# Patient Record
Sex: MALE
[De-identification: names, ages, dates, MRNs, and addresses within clinical notes are randomized per-mention and may not be internally consistent; named-entity substitution may affect disease eponyms.]

---

## 2017-04-14 ENCOUNTER — CHARTING TRANS (OUTPATIENT)
Dept: OTHER | Age: 72
End: 2017-04-14

## 2017-06-20 PROBLEM — Z86.73 H/O TIA (TRANSIENT ISCHEMIC ATTACK) AND STROKE: Status: ACTIVE | Noted: 2017-06-20

## 2017-10-07 PROBLEM — R93.1 ABNORMAL NUCLEAR CARDIAC IMAGING TEST: Status: ACTIVE | Noted: 2017-10-07

## 2018-09-04 PROBLEM — I10 ESSENTIAL HYPERTENSION: Status: ACTIVE | Noted: 2018-09-04

## 2018-09-04 PROBLEM — Z86.73 HISTORY OF TIA (TRANSIENT ISCHEMIC ATTACK): Status: ACTIVE | Noted: 2017-06-20

## 2018-11-05 VITALS
TEMPERATURE: 98.8 F | HEART RATE: 100 BPM | BODY MASS INDEX: 26.69 KG/M2 | SYSTOLIC BLOOD PRESSURE: 118 MMHG | WEIGHT: 208 LBS | DIASTOLIC BLOOD PRESSURE: 62 MMHG | RESPIRATION RATE: 16 BRPM | HEIGHT: 74 IN | OXYGEN SATURATION: 98 %

## 2020-11-23 ENCOUNTER — OFFICE VISIT (OUTPATIENT)
Dept: SURGERY | Facility: CLINIC | Age: 75
End: 2020-11-23
Payer: COMMERCIAL

## 2020-11-23 VITALS
SYSTOLIC BLOOD PRESSURE: 172 MMHG | WEIGHT: 208.81 LBS | HEART RATE: 75 BPM | DIASTOLIC BLOOD PRESSURE: 95 MMHG | TEMPERATURE: 97 F | BODY MASS INDEX: 26 KG/M2 | RESPIRATION RATE: 16 BRPM | OXYGEN SATURATION: 97 %

## 2020-11-23 DIAGNOSIS — C43.59 MALIGNANT MELANOMA OF SKIN OF TRUNK (HCC): Primary | ICD-10-CM

## 2020-11-23 PROCEDURE — 3077F SYST BP >= 140 MM HG: CPT | Performed by: SURGERY

## 2020-11-23 PROCEDURE — 99205 OFFICE O/P NEW HI 60 MIN: CPT | Performed by: SURGERY

## 2020-11-23 PROCEDURE — 3080F DIAST BP >= 90 MM HG: CPT | Performed by: SURGERY

## 2020-11-23 RX ORDER — LISINOPRIL 20 MG/1
20 TABLET ORAL DAILY
COMMUNITY
Start: 2020-11-09

## 2020-11-23 NOTE — PATIENT INSTRUCTIONS
Surgery: Wide excision melanoma right chest with sentinel lymph node biopsy    Date of Surgery: TBD    Hospital:    [facility] Philippe English., [place], [address]  Phone: [phone]    · This is a Outpatient procedure.   · Use the provide procedure. · Inform your primary care physician of your surgery and ask if him/her will need to see you prior to surgery. · If you develop symptoms of another illness prior to surgery please contact our office immediately.    · If this is an inpatient linda

## 2020-11-23 NOTE — CONSULTS
[facility]        Patient Name:  Portillo Oshea   YOB: 1945   Gender:  Male   Appt Date:  11/23/2020   Provider:  Jayme Verma MD     PATIENT PROVIDERS  Referring Provider: Jimmy Goldstein PA-C  Dermatologist: Antonio Park mouth 3 (three) times daily. , Disp: , Rfl:   •  omega-3 fatty acids 1000 MG Oral Cap, Take 2,000 mg by mouth daily. , Disp: , Rfl:   •  Vitamin B-12 1000 MCG Oral Tab, Take 1,000 mcg by mouth daily. , Disp: , Rfl:   •  folic acid (FOLVITE) 1 MG Oral Tab, Timoteo Dumont fatigue. · SKIN: + change in mole.    · HEENT: denies pain  · RESPIRATORY: denies shortness of breath, wheezing or cough   · CARDIOVASCULAR: denies chest pain, SOB, edema,orthopnea, no palpitations   · GI: denies nausea, vomiting, constipation, diarrhea; deep margin status, I discussed and recommended sentinel lymph node biopsy. The surgical treatment matter including indications and risks was discussed with him in detail.   Arrangements will be made to schedule either wide excision under local anesthesia

## 2020-11-30 ENCOUNTER — LAB ENCOUNTER (OUTPATIENT)
Dept: LAB | Facility: HOSPITAL | Age: 75
End: 2020-11-30
Attending: SURGERY

## 2020-11-30 DIAGNOSIS — C43.9 MALIGNANT MELANOMA (HCC): Primary | ICD-10-CM

## 2020-11-30 PROCEDURE — 88321 CONSLTJ&REPRT SLD PREP ELSWR: CPT

## 2020-12-03 ENCOUNTER — TELEPHONE (OUTPATIENT)
Dept: SURGERY | Facility: CLINIC | Age: 75
End: 2020-12-03

## 2020-12-03 NOTE — TELEPHONE ENCOUNTER
Called patient to schedule in office procedure since path has been completed he stated he had it done elsewhere but thanked us for our time and help

## 2021-06-14 ENCOUNTER — TELEPHONE (OUTPATIENT)
Dept: NEUROLOGY | Facility: CLINIC | Age: 76
End: 2021-06-14

## 2021-06-14 ENCOUNTER — OFFICE VISIT (OUTPATIENT)
Dept: NEUROLOGY | Facility: CLINIC | Age: 76
End: 2021-06-14
Payer: COMMERCIAL

## 2021-06-14 VITALS
WEIGHT: 215 LBS | HEART RATE: 100 BPM | OXYGEN SATURATION: 98 % | HEIGHT: 74 IN | BODY MASS INDEX: 27.59 KG/M2 | DIASTOLIC BLOOD PRESSURE: 72 MMHG | SYSTOLIC BLOOD PRESSURE: 126 MMHG

## 2021-06-14 DIAGNOSIS — Z79.01 ANTICOAGULANT LONG-TERM USE: ICD-10-CM

## 2021-06-14 DIAGNOSIS — Z96.652 HISTORY OF LEFT KNEE REPLACEMENT: ICD-10-CM

## 2021-06-14 DIAGNOSIS — I45.4 BUNDLE BRANCH BLOCK: ICD-10-CM

## 2021-06-14 DIAGNOSIS — R26.9 GAIT DISTURBANCE: ICD-10-CM

## 2021-06-14 DIAGNOSIS — M48.062 LUMBAR STENOSIS WITH NEUROGENIC CLAUDICATION: Primary | ICD-10-CM

## 2021-06-14 DIAGNOSIS — K21.9 GASTROESOPHAGEAL REFLUX DISEASE, UNSPECIFIED WHETHER ESOPHAGITIS PRESENT: ICD-10-CM

## 2021-06-14 DIAGNOSIS — M16.11 PRIMARY OSTEOARTHRITIS OF RIGHT HIP: ICD-10-CM

## 2021-06-14 DIAGNOSIS — Z96.642 HISTORY OF LEFT HIP REPLACEMENT: ICD-10-CM

## 2021-06-14 DIAGNOSIS — M48.02 CERVICAL STENOSIS OF SPINAL CANAL: ICD-10-CM

## 2021-06-14 DIAGNOSIS — F41.9 ANXIETY: ICD-10-CM

## 2021-06-14 DIAGNOSIS — M05.79 RHEUMATOID ARTHRITIS, SEROPOSITIVE, MULTIPLE SITES (HCC): ICD-10-CM

## 2021-06-14 PROBLEM — K44.9 HIATAL HERNIA: Status: ACTIVE | Noted: 2021-06-14

## 2021-06-14 PROCEDURE — 3074F SYST BP LT 130 MM HG: CPT | Performed by: PHYSICAL MEDICINE & REHABILITATION

## 2021-06-14 PROCEDURE — 99205 OFFICE O/P NEW HI 60 MIN: CPT | Performed by: PHYSICAL MEDICINE & REHABILITATION

## 2021-06-14 PROCEDURE — 3078F DIAST BP <80 MM HG: CPT | Performed by: PHYSICAL MEDICINE & REHABILITATION

## 2021-06-14 PROCEDURE — 3008F BODY MASS INDEX DOCD: CPT | Performed by: PHYSICAL MEDICINE & REHABILITATION

## 2021-06-14 NOTE — TELEPHONE ENCOUNTER
Notification or Prior Authorization is not required for the requested services L5-S1 interlaminar epidural injection cpt code 24859. Decision ID #:[id number] Will inform Nursing.

## 2021-06-14 NOTE — PROGRESS NOTES
[address]  Progress Note    CHIEF COMPLAINT:  Patient presents with:  Numbness: New right handed patient comes in for bilateral hip(L>R) weakness with N/T that radiates down to feet.  C/o tightness and inabi OTHER SURGICAL HISTORY  2004    right knee        SOCIAL HISTORY:   Social History    Occupational History      Not on file    Tobacco Use      Smoking status: Former Smoker        Packs/day: 2.00      Smokeless tobacco: Never Used      Tobacco comment: Yulissa Shoe Take 75 mg by mouth daily. ALLERGIES:     Diclofenac              UNKNOWN    Comment:Water retention / weigh gain  Voltaren [Diclofena*    SWELLING    Comment:Retaining water, sob.     REVIEW OF SYSTEMS:   Patient-reported ROS  Constitutional  Sleep at the ankles  Gait: Short steps, narrow base, decreased weightbearing on the right  SLR: neg    Data    Radiology Imaging:  No films available    1. Per care everywhere, Dr. Merlin Foreman note states:    \"MRI scan of the lumbar spine dated 01/20/2020, is review use  Will need to temporarily hold anticoagulants per PMD or cardiologist approval.  Will contact Dr. Teresa Soliz. 8. Bundle branch block  Tricyclic medications contraindicated due to presence of arrhythmia.     9. Gastroesophageal reflux disease, unspecif

## 2021-06-15 ENCOUNTER — TELEPHONE (OUTPATIENT)
Dept: NEUROLOGY | Facility: CLINIC | Age: 76
End: 2021-06-15

## 2021-06-16 NOTE — TELEPHONE ENCOUNTER
Patient has been scheduled for a L5-S1 interlaminar epidural injection  on 6/24/21 at the [facility]. Medications and allergies reviewed.  Patient informed we will need verbal or written authorization from patients Primary Care Physician/Cardiologist to hold bloo

## 2021-06-24 ENCOUNTER — OFFICE VISIT (OUTPATIENT)
Dept: SURGERY | Facility: CLINIC | Age: 76
End: 2021-06-24

## 2021-06-24 DIAGNOSIS — M48.062 LUMBAR STENOSIS WITH NEUROGENIC CLAUDICATION: Primary | ICD-10-CM

## 2021-06-24 PROCEDURE — 62323 NJX INTERLAMINAR LMBR/SAC: CPT | Performed by: PHYSICAL MEDICINE & REHABILITATION

## 2021-06-24 NOTE — PROCEDURES
Preoperative Diagnosis:  (S66.461) Lumbar stenosis with neurogenic claudication  (primary encounter diagnosis)       Postoperative Diagnosis:  (J38.602) Lumbar stenosis with neurogenic claudication  (primary encounter diagnosis)       Procedures:   L5-S1 i

## 2021-07-14 ENCOUNTER — OFFICE VISIT (OUTPATIENT)
Dept: NEUROLOGY | Facility: CLINIC | Age: 76
End: 2021-07-14
Payer: COMMERCIAL

## 2021-07-14 VITALS
WEIGHT: 215 LBS | SYSTOLIC BLOOD PRESSURE: 140 MMHG | DIASTOLIC BLOOD PRESSURE: 80 MMHG | HEART RATE: 70 BPM | BODY MASS INDEX: 27.59 KG/M2 | OXYGEN SATURATION: 96 % | HEIGHT: 74 IN

## 2021-07-14 DIAGNOSIS — Z79.01 ANTICOAGULANT LONG-TERM USE: ICD-10-CM

## 2021-07-14 DIAGNOSIS — M48.062 SPINAL STENOSIS OF LUMBAR REGION WITH NEUROGENIC CLAUDICATION: Primary | ICD-10-CM

## 2021-07-14 DIAGNOSIS — M05.79 RHEUMATOID ARTHRITIS, SEROPOSITIVE, MULTIPLE SITES (HCC): ICD-10-CM

## 2021-07-14 DIAGNOSIS — I45.4 BUNDLE BRANCH BLOCK: ICD-10-CM

## 2021-07-14 DIAGNOSIS — R26.9 GAIT DISTURBANCE: ICD-10-CM

## 2021-07-14 DIAGNOSIS — K21.9 GASTROESOPHAGEAL REFLUX DISEASE, UNSPECIFIED WHETHER ESOPHAGITIS PRESENT: ICD-10-CM

## 2021-07-14 DIAGNOSIS — M48.02 CERVICAL STENOSIS OF SPINAL CANAL: ICD-10-CM

## 2021-07-14 PROCEDURE — 3079F DIAST BP 80-89 MM HG: CPT | Performed by: PHYSICAL MEDICINE & REHABILITATION

## 2021-07-14 PROCEDURE — 3008F BODY MASS INDEX DOCD: CPT | Performed by: PHYSICAL MEDICINE & REHABILITATION

## 2021-07-14 PROCEDURE — 3077F SYST BP >= 140 MM HG: CPT | Performed by: PHYSICAL MEDICINE & REHABILITATION

## 2021-07-14 PROCEDURE — 99214 OFFICE O/P EST MOD 30 MIN: CPT | Performed by: PHYSICAL MEDICINE & REHABILITATION

## 2021-07-14 NOTE — PROGRESS NOTES
[address]  Progress Note    CHIEF COMPLAINT:  Patient presents with:  Numbness: pt is here for f/u numbness on legs and feet. LOV 6/24/21. pt  states 25% improvement post injection. rates pain 5/10. Laterality Date   • ANGIOGRAM  08/2018   • HIP REPLACEMENT SURGERY     • KNEE REPLACEMENT SURGERY     • OTHER SURGICAL HISTORY  1970    left knee cartilege removal   • OTHER SURGICAL HISTORY  2004    right knee        SOCIAL HISTORY:   Social History    Oc Oral Tablet 24 Hr Take 12.5 mg by mouth daily. • VIAGRA 100 MG Oral Tab Take 100 mg by mouth daily as needed. • Clopidogrel Bisulfate (PLAVIX) 75 MG Oral Tab Take 75 mg by mouth daily.          ALLERGIES:     Diclofenac              UNKNOWN    C stenosis, central and lateral recess stenosis L2-3, L3-4 and L4-L5. L5-S1 is well preserved. L1-L2 shows degenerative disk disease without conus or root compression. There is a slight degenerative tilt at the 2-3 level. \"    2.   An cervical MRI report from [facility] Redman

## 2021-07-15 PROBLEM — M48.062 SPINAL STENOSIS OF LUMBAR REGION WITH NEUROGENIC CLAUDICATION: Status: ACTIVE | Noted: 2021-06-14

## 2021-08-13 ENCOUNTER — OFFICE VISIT (OUTPATIENT)
Dept: NEUROLOGY | Facility: CLINIC | Age: 76
End: 2021-08-13
Payer: COMMERCIAL

## 2021-08-13 VITALS — BODY MASS INDEX: 27.3 KG/M2 | HEIGHT: 72.75 IN | WEIGHT: 206 LBS

## 2021-08-13 DIAGNOSIS — M48.02 CERVICAL STENOSIS OF SPINAL CANAL: ICD-10-CM

## 2021-08-13 DIAGNOSIS — R26.9 GAIT DISTURBANCE: ICD-10-CM

## 2021-08-13 DIAGNOSIS — M48.062 SPINAL STENOSIS OF LUMBAR REGION WITH NEUROGENIC CLAUDICATION: Primary | ICD-10-CM

## 2021-08-13 PROCEDURE — 99213 OFFICE O/P EST LOW 20 MIN: CPT | Performed by: PHYSICAL MEDICINE & REHABILITATION

## 2021-08-13 PROCEDURE — 3008F BODY MASS INDEX DOCD: CPT | Performed by: PHYSICAL MEDICINE & REHABILITATION

## 2021-08-13 NOTE — PROGRESS NOTES
[address]  Progress Note    CHIEF COMPLAINT:  Patient presents with:  Low Back Pain: Patient present for f/u on low back pain. State tightness of glute and hamstring.  when walking more than 13 minutes get n Comment: daily drink.        Drug use: No      Sexual activity: Not on file      CURRENT MEDICATIONS:   Current Outpatient Medications   Medication Sig Dispense Refill   • HYDROCHLOROTHIAZIDE 12.5 MG Oral Tab TAKE 1 TABLET BY MOUTH EVERY DAY (Patient taking Urinating: admits  Bladder Incontinence: denies   Musculoskeletal  Joint Stiffness: denies  Painful Joints: denies   Neurological  Loss of Strength Since last Visit: denies  Tingling/Numbness: admits (legs at times.)            All other systems reviewed a September    3. Gait disturbance  Might improve with cervical decompression. No orders of the defined types were placed in this encounter. Discharge Instructions were provided as documented in AVS summary.   The patient was in agreement with the

## 2021-11-03 ENCOUNTER — HOSPITAL ENCOUNTER (OUTPATIENT)
Dept: GENERAL RADIOLOGY | Facility: HOSPITAL | Age: 76
Discharge: HOME | End: 2021-11-03
Attending: ORTHOPAEDIC SURGERY
Payer: MEDICARE

## 2021-11-03 DIAGNOSIS — M47.12 CERVICAL ARTHRITIS WITH MYELOPATHY: ICD-10-CM

## 2021-11-03 PROCEDURE — 72040 X-RAY EXAM NECK SPINE 2-3 VW: CPT | Performed by: ORTHOPAEDIC SURGERY

## 2021-12-15 ENCOUNTER — HOSPITAL ENCOUNTER (OUTPATIENT)
Dept: GENERAL RADIOLOGY | Facility: HOSPITAL | Age: 76
Discharge: HOME | End: 2021-12-15
Attending: ORTHOPAEDIC SURGERY
Payer: MEDICARE

## 2021-12-15 DIAGNOSIS — M48.062 LUMBAR STENOSIS WITH NEUROGENIC CLAUDICATION: ICD-10-CM

## 2021-12-15 DIAGNOSIS — M47.12 CERVICAL SPONDYLOSIS WITH MYELOPATHY: ICD-10-CM

## 2021-12-15 PROCEDURE — 72050 X-RAY EXAM NECK SPINE 4/5VWS: CPT | Performed by: ORTHOPAEDIC SURGERY

## 2021-12-15 PROCEDURE — 72110 X-RAY EXAM L-2 SPINE 4/>VWS: CPT | Performed by: ORTHOPAEDIC SURGERY

## 2021-12-23 ENCOUNTER — HOSPITAL ENCOUNTER (OUTPATIENT)
Dept: MRI IMAGING | Age: 76
Discharge: HOME | End: 2021-12-23
Attending: ORTHOPAEDIC SURGERY
Payer: MEDICARE

## 2021-12-23 DIAGNOSIS — M48.062 LUMBAR STENOSIS WITH NEUROGENIC CLAUDICATION: ICD-10-CM

## 2021-12-23 PROCEDURE — 72148 MRI LUMBAR SPINE W/O DYE: CPT | Performed by: ORTHOPAEDIC SURGERY

## 2022-05-04 ENCOUNTER — HOSPITAL ENCOUNTER (OUTPATIENT)
Dept: GENERAL RADIOLOGY | Facility: HOSPITAL | Age: 77
Discharge: HOME | End: 2022-05-04
Attending: ORTHOPAEDIC SURGERY
Payer: MEDICARE

## 2022-05-04 DIAGNOSIS — M47.12 CERVICAL SPONDYLOSIS WITH MYELOPATHY: ICD-10-CM

## 2022-05-04 PROCEDURE — 72050 X-RAY EXAM NECK SPINE 4/5VWS: CPT | Performed by: ORTHOPAEDIC SURGERY

## 2023-01-18 ENCOUNTER — HOSPITAL ENCOUNTER (OUTPATIENT)
Dept: GENERAL RADIOLOGY | Facility: HOSPITAL | Age: 78
Discharge: HOME | End: 2023-01-18
Attending: ORTHOPAEDIC SURGERY
Payer: MEDICARE

## 2023-01-18 DIAGNOSIS — M47.12 CERVICAL SPONDYLOSIS WITH MYELOPATHY: ICD-10-CM

## 2023-01-18 PROCEDURE — 72050 X-RAY EXAM NECK SPINE 4/5VWS: CPT | Performed by: ORTHOPAEDIC SURGERY

## 2023-01-18 PROCEDURE — 72040 X-RAY EXAM NECK SPINE 2-3 VW: CPT | Performed by: ORTHOPAEDIC SURGERY

## 2023-09-25 ENCOUNTER — OFFICE VISIT (OUTPATIENT)
Dept: RHEUMATOLOGY | Facility: CLINIC | Age: 78
End: 2023-09-25
Payer: COMMERCIAL

## 2023-09-25 VITALS
SYSTOLIC BLOOD PRESSURE: 142 MMHG | HEART RATE: 77 BPM | DIASTOLIC BLOOD PRESSURE: 90 MMHG | TEMPERATURE: 98 F | HEIGHT: 74 IN | BODY MASS INDEX: 26.95 KG/M2 | OXYGEN SATURATION: 98 % | WEIGHT: 210 LBS | RESPIRATION RATE: 16 BRPM

## 2023-09-25 DIAGNOSIS — M06.09 RHEUMATOID ARTHRITIS OF MULTIPLE SITES WITH NEGATIVE RHEUMATOID FACTOR (HCC): Primary | ICD-10-CM

## 2023-09-25 DIAGNOSIS — M19.90 OSTEOARTHRITIS, UNSPECIFIED OSTEOARTHRITIS TYPE, UNSPECIFIED SITE: ICD-10-CM

## 2023-09-25 PROCEDURE — 3080F DIAST BP >= 90 MM HG: CPT | Performed by: INTERNAL MEDICINE

## 2023-09-25 PROCEDURE — 3077F SYST BP >= 140 MM HG: CPT | Performed by: INTERNAL MEDICINE

## 2023-09-25 PROCEDURE — 3008F BODY MASS INDEX DOCD: CPT | Performed by: INTERNAL MEDICINE

## 2023-09-25 PROCEDURE — 99215 OFFICE O/P EST HI 40 MIN: CPT | Performed by: INTERNAL MEDICINE

## 2023-09-25 RX ORDER — GABAPENTIN 300 MG/1
300 CAPSULE ORAL 3 TIMES DAILY
COMMUNITY
Start: 2023-03-26

## 2023-09-25 RX ORDER — FOLIC ACID 1 MG/1
1 TABLET ORAL DAILY
Qty: 90 TABLET | Refills: 1 | Status: SHIPPED | OUTPATIENT
Start: 2023-09-25

## 2023-09-25 NOTE — PATIENT INSTRUCTIONS
OSTEOARTHRITIS    Fast Facts    Though some of the joint changes are irreversible, most patients will not need joint replacement surgery. OA symptoms (what you feel) can vary greatly among patients. A rheumatologist can detect arthritis and prescribe the proper treatment. The goal of treatment in OA is to reduce pain and improve function. Exercise is an important part of OA treatment, because it can decrease joint pain and improve function. At present, there is no treatment that can reverse the damage of OA in the joints. Researchers are trying to find ways to slow or reverse this joint damage. Osteoarthritis (also known as OA) is a common joint disease that most often affects middle-age to elderly people. It is commonly referred to as \"wear and tear\" of the joints, but we now know that OA is a disease of the entire joint, involving the cartilage, joint lining, ligaments, and bone. Although it is more common in older people, it is not really accurate to say that the joints are just \"wearing out. \" It is characterized by breakdown of the cartilage (the tissue that cushions the ends of the bones between joints), bony changes of the joints, deterioration of tendons and ligaments, and various degrees of inflammation of the joint lining (called the synovium). This arthritis tends to occur in the hand joints, spine, hips, knees, and great toes. The lifetime risk of developing OA of the knee is about 46%, and the lifetime risk of developing OA of the hip is 25%, according to the [facility], a long-term study from the [address] [place] [address] and sponsored by [employer] for [employer] and [employer] (often called the [place]) and the [place]. OA is a top cause of disability in older people. The goal of osteoarthritis treatment is to reduce pain and improve function.  There is no cure for the disease, but some treatments attempt to slow disease progression. What is osteoarthritis? OA is a frequently slowly progressive joint disease typically seen in middle-aged to elderly people. In osteoarthritis, the cartilage between the bones in the joint breaks down. This causes the affected bones to slowly get bigger. The joint cartilage often breaks down because of mechanical stress or biochemical changes within the body, causing the bone underneath to fail. OA can occur together with other types of arthritis, such as gout or rheumatoid arthritis. OA tends to affect commonly used joints such as the hands and spine, and the weight-bearing joints such as the hips and knees. Symptoms include:    Joint pain and stiffness    Knobby swelling at the joint    Cracking or grinding noise with joint movement    Decreased function of the joint    How do you treat osteoarthritis? There is no proven treatment yet that can reverse joint damage from OA. The goal of osteoarthritis treatment is to reduce pain and improve function of the affected joints. Most often, this is possible with a mixture of physical measures and drug therapy and, sometimes, surgery. Physical measures: Weight loss and exercise are useful in OA. Excess weight puts stress on your knee joints and hips and low back. For every 10 pounds of weight you lose over 10 years, you can reduce the chance of developing knee OA by up to 50 percent. Exercise can improve your muscle strength, decrease joint pain and stiffness, and lower the chance of disability due to OA. Also helpful are support (\"assistive\") devices, such as orthotics or a walking cane, that help you do daily activities. Heat or cold therapy can help relieve OA symptoms for a short time. Certain alternative treatments such as spa (hot tub), massage, and chiropractic manipulation can help relieve pain for a short time. They can be costly, though, and require repeated treatments.  Also, the long-term benefits of these alternative (sometimes called complementary or integrative) medicine treatments are unproven but are under study. Drug therapy: Forms of drug therapy include topical, oral (by mouth) and injections (shots). You apply topical drugs directly on the skin over the affected joints. These medicines include capsaicin cream, lidocaine and diclofenac gel. Oral pain relievers such as acetaminophen are common first treatments. So are nonsteroidal anti-inflammatory drugs (often called NSAIDs), which decrease swelling and pain. In 2010, the government (FDA) approved the use of duloxetine (Cymbalta) for chronic (long-term) musculoskeletal pain including from OA. This oral drug is not new. It also is in use for other health concerns, such as mood disorders, nerve pain and fibromyalgia. Patients with more serious pain may need stronger medications, such as prescription narcotics. Joint injections with corticosteroids (sometimes called cortisone shots) or with a form of lubricant called hyaluronic acid can give months of pain relief from OA. This lubricant is given in the knee, and these shots may help delay the need for a knee replacement by a few years in some patients. Surgery: Surgical treatment becomes an option for severe cases. This includes when the joint has serious damage, or when medical treatment fails to relieve pain and you have major loss of function. Surgery may involve arthroscopy, repair of the joint done through small incisions (cuts). If the joint damage cannot be repaired, you may need a joint replacement. Supplements: Many over-the-counter nutrition supplements have been used for osteoarthritis treatment. Most lack good research data to support their effectiveness and safety. Among the most widely used are calcium, vitamin D and omega-3 fatty acids. To ensure safety and avoid drug interactions, consult your doctor or pharmacist before using any of these supplements.  This is especially true when you are combining these supplements with prescribed

## 2023-10-11 ENCOUNTER — TELEPHONE (OUTPATIENT)
Dept: RHEUMATOLOGY | Facility: CLINIC | Age: 78
End: 2023-10-11

## 2024-03-18 ENCOUNTER — OFFICE VISIT (OUTPATIENT)
Dept: RHEUMATOLOGY | Facility: CLINIC | Age: 79
End: 2024-03-18
Payer: COMMERCIAL

## 2024-03-18 ENCOUNTER — LAB ENCOUNTER (OUTPATIENT)
Dept: LAB | Age: 79
End: 2024-03-18
Attending: INTERNAL MEDICINE
Payer: MEDICARE

## 2024-03-18 ENCOUNTER — HOSPITAL ENCOUNTER (OUTPATIENT)
Dept: GENERAL RADIOLOGY | Age: 79
Discharge: HOME | End: 2024-03-18
Attending: INTERNAL MEDICINE
Payer: MEDICARE

## 2024-03-18 VITALS
SYSTOLIC BLOOD PRESSURE: 134 MMHG | DIASTOLIC BLOOD PRESSURE: 72 MMHG | HEIGHT: 73 IN | OXYGEN SATURATION: 98 % | WEIGHT: 205 LBS | RESPIRATION RATE: 16 BRPM | HEART RATE: 68 BPM | BODY MASS INDEX: 27.17 KG/M2 | TEMPERATURE: 98 F

## 2024-03-18 DIAGNOSIS — Z79.899 ENCOUNTER FOR DRUG THERAPY: ICD-10-CM

## 2024-03-18 DIAGNOSIS — M48.062 SPINAL STENOSIS OF LUMBAR REGION WITH NEUROGENIC CLAUDICATION: ICD-10-CM

## 2024-03-18 DIAGNOSIS — M06.09 RHEUMATOID ARTHRITIS OF MULTIPLE SITES WITH NEGATIVE RHEUMATOID FACTOR (HCC): Primary | ICD-10-CM

## 2024-03-18 DIAGNOSIS — M19.90 OSTEOARTHRITIS, UNSPECIFIED OSTEOARTHRITIS TYPE, UNSPECIFIED SITE: ICD-10-CM

## 2024-03-18 DIAGNOSIS — M06.09 RHEUMATOID ARTHRITIS OF MULTIPLE SITES WITH NEGATIVE RHEUMATOID FACTOR (HCC): ICD-10-CM

## 2024-03-18 LAB
ALBUMIN SERPL-MCNC: 3.7 G/DL (ref 3.4–5)
ALBUMIN/GLOB SERPL: 1.4 {RATIO} (ref 1–2)
ALP LIVER SERPL-CCNC: 85 U/L
ALT SERPL-CCNC: 23 U/L
ANION GAP SERPL CALC-SCNC: 5 MMOL/L (ref 0–18)
AST SERPL-CCNC: 11 U/L (ref 15–37)
BASOPHILS # BLD AUTO: 0.03 X10(3) UL (ref 0–0.2)
BASOPHILS NFR BLD AUTO: 0.5 %
BILIRUB SERPL-MCNC: 0.6 MG/DL (ref 0.1–2)
BUN BLD-MCNC: 15 MG/DL (ref 9–23)
CALCIUM BLD-MCNC: 9 MG/DL (ref 8.5–10.1)
CHLORIDE SERPL-SCNC: 107 MMOL/L (ref 98–112)
CO2 SERPL-SCNC: 27 MMOL/L (ref 21–32)
CREAT BLD-MCNC: 1.04 MG/DL
EGFRCR SERPLBLD CKD-EPI 2021: 74 ML/MIN/1.73M2 (ref 60–?)
EOSINOPHIL # BLD AUTO: 0.1 X10(3) UL (ref 0–0.7)
EOSINOPHIL NFR BLD AUTO: 1.6 %
ERYTHROCYTE [DISTWIDTH] IN BLOOD BY AUTOMATED COUNT: 13.4 %
ERYTHROCYTE [SEDIMENTATION RATE] IN BLOOD: 4 MM/HR
FASTING STATUS PATIENT QL REPORTED: YES
GLOBULIN PLAS-MCNC: 2.7 G/DL (ref 2.8–4.4)
GLUCOSE BLD-MCNC: 98 MG/DL (ref 70–99)
HCT VFR BLD AUTO: 41.1 %
HGB BLD-MCNC: 13.6 G/DL
IMM GRANULOCYTES # BLD AUTO: 0.03 X10(3) UL (ref 0–1)
IMM GRANULOCYTES NFR BLD: 0.5 %
LYMPHOCYTES # BLD AUTO: 1.63 X10(3) UL (ref 1–4)
LYMPHOCYTES NFR BLD AUTO: 26 %
MCH RBC QN AUTO: 32.5 PG (ref 26–34)
MCHC RBC AUTO-ENTMCNC: 33.1 G/DL (ref 31–37)
MCV RBC AUTO: 98.3 FL
MONOCYTES # BLD AUTO: 0.61 X10(3) UL (ref 0.1–1)
MONOCYTES NFR BLD AUTO: 9.7 %
NEUTROPHILS # BLD AUTO: 3.87 X10 (3) UL (ref 1.5–7.7)
NEUTROPHILS # BLD AUTO: 3.87 X10(3) UL (ref 1.5–7.7)
NEUTROPHILS NFR BLD AUTO: 61.7 %
OSMOLALITY SERPL CALC.SUM OF ELEC: 289 MOSM/KG (ref 275–295)
PLATELET # BLD AUTO: 249 10(3)UL (ref 150–450)
POTASSIUM SERPL-SCNC: 4.3 MMOL/L (ref 3.5–5.1)
PROT SERPL-MCNC: 6.4 G/DL (ref 6.4–8.2)
RBC # BLD AUTO: 4.18 X10(6)UL
SODIUM SERPL-SCNC: 139 MMOL/L (ref 136–145)
WBC # BLD AUTO: 6.3 X10(3) UL (ref 4–11)

## 2024-03-18 PROCEDURE — 73130 X-RAY EXAM OF HAND: CPT | Performed by: INTERNAL MEDICINE

## 2024-03-18 PROCEDURE — 85025 COMPLETE CBC W/AUTO DIFF WBC: CPT

## 2024-03-18 PROCEDURE — 85652 RBC SED RATE AUTOMATED: CPT

## 2024-03-18 PROCEDURE — 80053 COMPREHEN METABOLIC PANEL: CPT

## 2024-03-18 PROCEDURE — 99214 OFFICE O/P EST MOD 30 MIN: CPT | Performed by: INTERNAL MEDICINE

## 2024-03-18 PROCEDURE — 36415 COLL VENOUS BLD VENIPUNCTURE: CPT

## 2024-03-18 RX ORDER — TRAMADOL HYDROCHLORIDE 50 MG/1
1 TABLET ORAL EVERY 8 HOURS PRN
COMMUNITY
Start: 2023-10-21

## 2024-03-18 NOTE — PROGRESS NOTES
[facility], [address] [place] [address]      Consult     Ramiro Block Patient Status:  No patient class for patient encounter    6/3/1945 MRN [id number]   Location [facility], [address] [place] [address] Attending No att. providers found   Hosp Day # 0 PCP GIL GANN     Referring Provider: PCP    Reason for Consultation: Rheumatoid arthritis seronegative    Subjective:    Ramiro Block is a 78 year old  male comes in for reevaluation for seronegative rheumatoid arthritis and generalized osteoarthritis.     Patient was last seen in clinic 2023    He was going through some issues with significant back pain and neuropathy    We had offered him gabapentin which he had started and taken over by a pain specialist at [facility]    He is now up to 3 tablets a day around 1200 mg a day    At that time because of all his pain we continued methotrexate 15 mg a week     He is reluctant to wean    Sometime 2023 he had sudden foot drop and weakness likely from findings found on MRI of a collapse of the vertebra.  He was not interested in surgery and is doing physical therapy aggressively with some improvement.  He was told with a second opinion surgery may not fix the problem he is happy with his progress     He is finally done with rehab program and doing quite well.  He is now going to focus on his wife to get knee replacement in 2023    At this time he is doing quite well with the combination of physical therapy and gabapentin    States no swelling of his joints he is now willing to wean down methotrexate further    He has not had any worsening symptoms    Patient states his PCP had filled his methotrexate and he did not get his labs in between visits.  Reminded patient the importance of getting methotrexate labs to monitor for drug toxicity new standing labs given to patient today    Also suggested updating hand x-rays to monitor for radiographic  progression    Unfortunately in 2022 he had revision surgery of his right hip since it gave out on 2 occasions. He also had lumbar surgery about a year ago    Around that time he had CT of the lower spine reviewed with patient today showing areas of stenosis arthritis and disc bulging    We have discussed obtaining ESR CRP as well as his methotrexate labs today    At the rare consideration of PMR. However ESR and CRP will likely be elevated because of recent surgeries as well as a possibility    His second revision was September 2022    He is taking his meloxicam.     About a year ago he did get cervical neck fusion    He is using a walker because of instability of his lower extremities and pain    States no flareups of the rheumatoid arthritis or joint swelling that is concerning.    He did have recent cardiac clearance and chest x-ray which was unremarkable in September 2021    He did have some mild edema of his lower extremities where he was placed on Lasix with improvement.    States no swelling of his joints otherwise. He did see his neurosurgeon who did repeat another MRI of the cervical neck which showed stable stenosis and arthritis in 2017. H    Previously in September 2021 he did get    Cervical laminectomy because of balance issues and worsening neck pain with improvement overall    Denies any urinary bowel incontinence     States no swelling of his joints otherwise. States no infections    He did get his vaccinations for coronavirus without any side effects or problem    His knee replacement of the left knee within about 6 years and continues to do well.     Bone density was done 2018 and reviewed with patient normal density improvement in T-scores of lumbar spine and hip past years.     Denies any swelling of his joints. No shortness of breath chest pain fevers or chills.     X-rays of the hands and feet upddated December 2017 showing stable and osteoarthritis. vectra testing was stable at 35.    he  denies any swelling in his joints overall pain levels are minimal to none except for occasional back pain.     his weight has been stable denies any headaches to claudication vision changes.    He denies any infections since last visit. He is not taking meloxicam regularly.     They have Taken him off Plavix as well     He understands the potential interaction between both.    History/Other:      Past Medical History:  Past Medical History:   Diagnosis Date    Esophageal reflux     Essential hypertension     Heart attack (HCC)     High blood pressure     High cholesterol     Hyperlipidemia     Osteoarthrosis, unspecified whether generalized or localized, unspecified site     bilateral knees.     RBBB (right bundle branch block)     Tachycardia     TIA (transient ischemic attack) april 2012        Past Surgical History:   Past Surgical History:   Procedure Laterality Date    ANGIOGRAM  08/2018    BACK SURGERY N/A 04/2022    CERVICAL LAMINOPLASTY  09/2021    [facility]     HIP REPLACEMENT SURGERY      KNEE REPLACEMENT SURGERY      OTHER SURGICAL HISTORY  1970    left knee cartilege removal    OTHER SURGICAL HISTORY  2004    right knee        Social History:  reports that he has quit smoking. His smoking use included cigarettes. He smoked an average of 2 packs per day. He has never used smokeless tobacco. He reports current alcohol use of about 6.0 standard drinks of alcohol per week. He reports that he does not use drugs.    Family History: No family history on file.    Allergies:   Allergies   Allergen Reactions    Diclofenac UNKNOWN     Water retention / weigh gain    Voltaren [Diclofenac Sodium] SWELLING     Retaining water, sob.        Current Medications:  Current Outpatient Medications   Medication Sig Dispense Refill    traMADol 50 MG Oral Tab Take 1 tablet (50 mg total) by mouth every 8 (eight) hours as needed.      gabapentin 300 MG Oral Cap Take 1 capsule (300 mg total) by mouth in the morning, at  noon, in the evening, and at bedtime. TID      methotrexate 2.5 MG Oral Tab Take 5 tablets (12.5 mg total) by mouth once a week. (Patient taking differently: Take 6 tablets (15 mg total) by mouth once a week.) 20 tablet 2    folic acid 1 MG Oral Tab Take 1 tablet (1 mg total) by mouth daily. 90 tablet 1    HYDROCHLOROTHIAZIDE 12.5 MG Oral Tab TAKE 1 TABLET BY MOUTH EVERY DAY (Patient taking differently: Take 1 tablet (12.5 mg total) by mouth as needed.) 90 tablet 1    Terazosin HCl 2 MG Oral Cap Take 1 capsule (2 mg total) by mouth nightly.      Vitamin D3, Cholecalciferol, 50 MCG (2000 UT) Oral Cap Take 1 capsule (2,000 Units total) by mouth daily.      Turmeric 450 MG Oral Cap Take 1 tablet by mouth daily.      Ascorbic Acid (VITAMIN C) 1000 MG Oral Tab Take 1 tablet (1,000 mg total) by mouth daily.      lisinopril 20 MG Oral Tab Take 1 tablet (20 mg total) by mouth daily.      Rosuvastatin Calcium 5 MG Oral Tab Take 1 tablet (5 mg total) by mouth nightly. 90 tablet 1    omeprazole 20 MG Oral Capsule Delayed Release Take 1 capsule (20 mg total) by mouth. As needed once every other day      omega-3 fatty acids 1000 MG Oral Cap Take 2,000 mg by mouth daily.      Vitamin B-12 1000 MCG Oral Tab Take 1 tablet (1,000 mcg total) by mouth daily.      Metoprolol Succinate ER (TOPROL XL) 25 MG Oral Tablet 24 Hr Take 0.5 tablets (12.5 mg total) by mouth daily.      VIAGRA 100 MG Oral Tab Take 1 tablet (100 mg total) by mouth daily as needed.            (Not in a hospital admission)      Review of Systems:     Constitutional: Negative for chills, , fatigue, fever and unexpected weight change.    HENT: Negative for congestion, and mouth sores.    Eyes: Negative for photophobia, pain, redness and visual disturbance.    Respiratory: Negative for apnea, cough, chest tightness, shortness of breath, wheezing and stridor.    Cardiovascular: Negative for chest pain, palpitations and leg swelling.    Gastrointestinal: Negative for  abdominal distention, abdominal pain, blood in stool, constipation, diarrhea and nausea.    Endocrine: Negative.     Genitourinary: Negative for decreased urine volume, difficulty urinating, dyspareunia, dysuria, flank pain, and frequency.    Musculoskeletal: Occasional arthralgias, +gait problem from foot drop and NO joint swelling.    Skin: Negative for color change, pallor and rash. No raynauds or digital ulcerations no sclerodactly.    Allergic/Immunologic: Negative.    Neurological: Negative for dizziness, tremors, seizures, syncope, speech difficulty, weakness, light-headedness, numbness and headaches.    Hematological: Does not bruise/bleed easily.    Psychiatric/Behavioral: Negative for confusion, decreased concentration, hallucinations, self-injury, sleep disturbance and suicidal ideas or depression.    Objective:   Vitals:    03/18/24 1028   BP: 134/72   Pulse: 68   Resp: 16   Temp: 98.1 °F (36.7 °C)          Constitutional: is oriented to person, place, and time. Appears well-developed and well-nourished. No distress.    HEENT: Normocephalic; EOMI; no jvd; no LAD; no oral or nasal ulcers.     Eyes: Conjunctivae and EOM are normal. Pupils are equal, round, and reactive to light.     Neck: Normal range of motion. No thyromegaly present.    Cardiovascular: RRR, no murmurs.    Lungs: Clear, Bilateral air entry, no wheezes.    Abdominal: Soft.    Musculoskeletal:    There is currently no information documented on the homunculus. Go to the Rheumatology activity and complete the homunculus joint exam.     Joint Exam 03/18/2024     No joint exam has been documented for this visit        Swollen: -- 0    Tender: -- 0        Right shoulder: Exhibits normal range of motion on abduction and internal rotation, no tenderness, no bony tenderness, no deformity, no laceration, no pain and no spasm.        Left shoulder: Exhibits normal range of motion on abduction and internal and external rotation.  no tenderness, no  bony tenderness, no swelling, no effusion, no deformity, no pain, no spasm and normal strength.        Right elbow:  Exhibits normal range of motion, no swelling, no effusion and no deformity. No tenderness found. No medial epicondyle, no lateral epicondyle and no olecranon process tenderness noted. There are no contractures or tophi or nodules.        Left elbow:  Normal range of motion, no swelling, no effusion and no deformity. No medial epicondyle, no lateral epicondyle and no olecranon process tenderness noted. There are no contractures or tophi or nodules.        Right wrist:  Exhibits normal range of motion, no tenderness, no bony tenderness, no swelling, no effusion and no crepitus. Flexion and extension intact w/o limitation.        Left wrist: Exhibits normal range of motion, no tenderness, no bony tenderness, no swelling, no effusion, no crepitus and no deformity. Flexion and extension intact without limitation.        Right hip: Exhibits normal range of motion, normal strength, no tenderness, no bony tenderness, no swelling and no crepitus.        Right hand: No synovitis of MCP,PIP or DIP joints; no Bouchards there are scattered Heberden nodules noted;  strength: 100%.  Mild squaring first CMC joint        Left hand: No synovitis of MCP,PIP or DIP joints; no Bouchards there are scattered Heberden nodules noted;  strength: 100%.  Mild squaring first CMC joint        Left hip: Exhibits normal range of motion, normal strength, no tenderness, no bony tenderness, No swelling and no crepitus.        Right knee: Exhibits normal range of motion, no swelling, no effusion, no ecchymosis, no deformity and no erythema. No tenderness found. No medial joint line, no lateral joint line, no MCL and no LCL tenderness noted. mild crepitation on flexion of knee and extension normal.        Left knee:  Exhibits normal range of motion, no swelling, no effusion, no ecchymosis and no erythema. No tenderness found. No  medial joint line, no lateral joint line and no patellar tendon tenderness noted. mild crepitation on flexion of the knee. Extension intact and normal.        Right ankle: No swelling, no deformity. No tenderness. Dorsiflexion and plantar flexion intact without limitation in range of motion.        Left ankle: Exhibits no swelling. No tenderness. No lateral malleolus and no medial malleolus tenderness found. Achilles tendon normal. Achilles tendon exhibits no pain, no defect and normal Moss's test results.  Dorsiflexion and plantar flexion intact without limitation in range of motion.        Cervical back: Exhibits normal range of motion, no tenderness, no bony tenderness, no swelling, no pain and no spasm.        Thoracic back: Exhibits normal range of motion, no tenderness, no bony tenderness and no spasm.        Lumbar back:  Exhibits normal range of motion, no tenderness, no bony tenderness, no pain and no spasm.        Right foot: normal. There is normal range of motion, no tenderness, no bony tenderness, no crepitus and no laceration. There is no synovitis or tenderness of the MTP joints to palpation.  Bony enlargement MTP joint        Left foot: normal. There is normal range of motion, no tenderness, no bony tenderness and no crepitus. There is no synovitis or tenderness of the MTP joints to palpation.  Bony enlargement MTP joints    Lymphadenopathy: No submental, no submandibular, and no occipital adenopathy present, has no cervical adenopathy or axillary lympadenopathy.    Neurological: Alert and oriented. No focal motor or sensory abnormalities. Strength is 5/5 Upper Extremities/Lower Extremities proximally and distall  Noted mild foot drop right leg right foot/3+4 plantarflexion and dorsiflexion    Skin: Skin is warm, dry and intact.  No rashes no purpuric petechial lesions    Psychiatric: Normal behavior.    Results:    Labs:      Lab Results   Component Value Date    HGB 14.0 07/24/2018    HCT 40.5  07/24/2018    MCV 95.8 07/24/2018    MCH 33.0 07/24/2018    MCHC 34.5 07/24/2018    RDW 14.0 07/24/2018       No components found for: \"RELY\", \"NMET\", \"MYEL\", \"PROMY\", \"BRUNO\", \"ABSNEUTS\", \"ABSBANDS\", \"ABMM\", \"ABMY\", \"ABPM\", \"ABBL\"      Lab Results   Component Value Date     07/24/2018    K 4.0 07/24/2018    CO2 27 07/24/2018    BUN 9 07/24/2018          No components found for: \"ESRWESTERGRN\"       No results found for: \"CRP\"      No results found for: \"COLOR\", \"CLARITY\", \"UROBILINOGEN\", \"YEAST\"  @LABRCNTIP(RF,B12)@      [unfilled]    Imaging:  No results found.    Assessment & Plan:      78-year-old woman comes in for reevaluation for:    Seronegative rheumatoid arthritis  Generalized osteoarthritis multiple joints  History of chronic neck pain and stenosis and arthritis  High drug risk monitoring  Liver nodules of unclear etiology followed by PCP  Status post cervical and lumbar fusion with last surgery 4 months ago and improvement in neuropathies  History of lumbar stenosis DJD and disc bulges followed by pain management  Revision of right hip surgery September 2022  Right foot drop improving status postsurgery followed by spine surgeo doing physical therapy    Patient has no joint swelling or synovitis on exam    X-rays of the hands and feet show stable degenerative osteoarthritis 2017    New hand x-ray orders given to patient    Apparently did get the DEXA scan given to him September 2023 at [facility].  Would like to obtain these results    Last vectra was 35    Patient remained on methotrexate 15 mg a week, folate acid 1 mg daily    New standing labs given to patient to monitor for drug toxicity from methotrexate.  Reminded patient importance of doing so at this time    Continue monitoring labs every 8-12 weeks for drug toxicity.  It appears his PCP had given him refills without monitoring labs.  Discussed importance of making sure he gets methotrexate from us and gets labs on time for  safety reasons    He is followed by [facility] neurosurgery with recent cervical laminectomy and improvement. He is also aware he has lumbar stenosis and claudication. He is not interested in surgery as of yet.    Head CT showing moderate severe stenosis likely causing some of this pain    We have initiated gabapentin but this is now taken over by pain management and he has up to 5 tablets a day with improvement along with physical therapy    He is off Plavix and now on baby aspirin       Education and counseling provided to patient.  Instructed patient to call my office or seek medical attention immediately if symptoms worsen. Risks and side effects of medications and diagnosis discussed in detail and patient was given written information on new prescribed medications.    Return to clinic:  Return in about 6 months (around 9/18/2024).    Julee Vyas MD  3/18/2024

## 2024-09-18 ENCOUNTER — TELEPHONE (OUTPATIENT)
Dept: RHEUMATOLOGY | Facility: CLINIC | Age: 79
End: 2024-09-18

## 2024-09-18 ENCOUNTER — OFFICE VISIT (OUTPATIENT)
Dept: RHEUMATOLOGY | Facility: CLINIC | Age: 79
End: 2024-09-18
Payer: COMMERCIAL

## 2024-09-18 VITALS
BODY MASS INDEX: 27.83 KG/M2 | OXYGEN SATURATION: 98 % | WEIGHT: 210 LBS | DIASTOLIC BLOOD PRESSURE: 82 MMHG | HEIGHT: 73 IN | HEART RATE: 68 BPM | SYSTOLIC BLOOD PRESSURE: 120 MMHG | RESPIRATION RATE: 16 BRPM | TEMPERATURE: 98 F

## 2024-09-18 DIAGNOSIS — M06.09 RHEUMATOID ARTHRITIS OF MULTIPLE SITES WITH NEGATIVE RHEUMATOID FACTOR (HCC): ICD-10-CM

## 2024-09-18 DIAGNOSIS — M17.0 PRIMARY OSTEOARTHRITIS OF BOTH KNEES: ICD-10-CM

## 2024-09-18 DIAGNOSIS — C43.59 MALIGNANT MELANOMA OF SKIN OF TRUNK (HCC): Primary | ICD-10-CM

## 2024-09-18 DIAGNOSIS — M48.02 CERVICAL STENOSIS OF SPINAL CANAL: ICD-10-CM

## 2024-09-18 DIAGNOSIS — Z79.899 ENCOUNTER FOR DRUG THERAPY: ICD-10-CM

## 2024-09-18 DIAGNOSIS — M48.062 SPINAL STENOSIS OF LUMBAR REGION WITH NEUROGENIC CLAUDICATION: ICD-10-CM

## 2024-09-18 PROCEDURE — 99214 OFFICE O/P EST MOD 30 MIN: CPT | Performed by: INTERNAL MEDICINE

## 2024-09-18 RX ORDER — METHOTREXATE 2.5 MG/1
12.5 TABLET ORAL WEEKLY
Qty: 20 TABLET | Refills: 0 | Status: SHIPPED | OUTPATIENT
Start: 2024-09-18

## 2024-09-18 NOTE — PROGRESS NOTES
[facility], [address] [place] [address]      Consult     Ramiro Block Patient Status:  No patient class for patient encounter    6/3/1945 MRN [id number]   Location [facility], [address] [place] [address] Attending No att. providers found   Hosp Day # 0 PCP GIL GNAN     Referring Provider: PCP    Reason for Consultation: Rheumatoid arthritis seronegative    Subjective:    Ramiro Block is a 79 year old  male comes in for reevaluation for seronegative rheumatoid arthritis and generalized osteoarthritis.     Patient was last seen in clinic 2024    He was going through some issues with significant back pain and neuropathy    We had offered him gabapentin which he had started and taken over by a pain specialist at [facility]    He is now up to 3 tablets a day around 1200 mg a day    At that time because of all his pain we continued methotrexate 15 mg a week     He is now okay to wean to 12.5 mg once a week    He has chronic issues since 2023 he had sudden foot drop and weakness likely from findings found on MRI of a collapse of the vertebra.  He was not interested in surgery and is doing physical therapy aggressively with some improvement.  He is slowly improving     He has seen several second opinions who suggested no surgery for at least a year     We had given him an order to check a bone density 2024.  He is not sure if he did it at [facility]     Will try obtain these results if not he will get this done     Also states he had labs through his PCP 2024    At this time he is doing quite well from a pain perspective with the combination of physical therapy and gabapentin    States no swelling of his joints he is now willing to wean down methotrexate further    He has not had any worsening symptoms    Patient states his PCP had filled his methotrexate and he did not get his labs in between visits.  Reminded patient the importance of  getting methotrexate labs to monitor for drug toxicity new standing labs given to patient today    Also suggested updating hand x-rays to monitor for radiographic progression    Unfortunately in 2022 he had revision surgery of his right hip since it gave out on 2 occasions. He also had lumbar surgery about a year ago    Around that time he had CT of the lower spine reviewed with patient today showing areas of stenosis arthritis and disc bulging    His second revision was September 2022    He is taking his meloxicam.     About a year ago he did get cervical neck fusion    He is using a walker because of instability of his lower extremities and pain    States no flareups of the rheumatoid arthritis or joint swelling that is concerning.    He did have recent cardiac clearance and chest x-ray which was unremarkable in September 2021    He did have some mild edema of his lower extremities where he was placed on Lasix with improvement.    States no swelling of his joints otherwise. He did see his neurosurgeon who did repeat another MRI of the cervical neck which showed stable stenosis and arthritis in 2017. H    Previously in September 2021 he did get    Cervical laminectomy because of balance issues and worsening neck pain with improvement overall    Denies any urinary bowel incontinence     States no swelling of his joints otherwise. States no infections    He did get his vaccinations for coronavirus without any side effects or problem    His knee replacement of the left knee within about 6 years and continues to do well.     Bone density was done 2018 and reviewed with patient normal density improvement in T-scores of lumbar spine and hip past years.     Denies any swelling of his joints. No shortness of breath chest pain fevers or chills.     X-rays of the hands and feet upddated December 2017 showing stable and osteoarthritis. vectra testing was stable at 35.    he denies any swelling in his joints overall pain  levels are minimal to none except for occasional back pain.     his weight has been stable denies any headaches to claudication vision changes.    He denies any infections since last visit. He is not taking meloxicam regularly.     They have Taken him off Plavix as well     He understands the potential interaction between both.    History/Other:      Past Medical History:  Past Medical History:    Esophageal reflux    Essential hypertension    Heart attack (HCC)    High blood pressure    High cholesterol    Hyperlipidemia    Osteoarthrosis, unspecified whether generalized or localized, unspecified site    bilateral knees.     RBBB (right bundle branch block)    Tachycardia    TIA (transient ischemic attack)        Past Surgical History:   Past Surgical History:   Procedure Laterality Date    Angiogram  08/2018    Back surgery N/A 04/2022    Cervical laminoplasty  09/2021    [facility]     Hip replacement surgery      Knee replacement surgery      Other surgical history  1970    left knee cartilege removal    Other surgical history  2004    right knee        Social History:  reports that he has quit smoking. His smoking use included cigarettes. He has never used smokeless tobacco. He reports current alcohol use of about 6.0 standard drinks of alcohol per week. He reports that he does not use drugs.    Family History: History reviewed. No pertinent family history.    Allergies:   Allergies   Allergen Reactions    Diclofenac UNKNOWN     Water retention / weigh gain    Voltaren [Diclofenac Sodium] SWELLING     Retaining water, sob.        Current Medications:  Current Outpatient Medications   Medication Sig Dispense Refill    traMADol 50 MG Oral Tab Take 1 tablet (50 mg total) by mouth every 8 (eight) hours as needed.      gabapentin 300 MG Oral Cap Take 1 capsule (300 mg total) by mouth in the morning, at noon, in the evening, and at bedtime. TID      methotrexate 2.5 MG Oral Tab Take 5 tablets (12.5 mg  total) by mouth once a week. (Patient taking differently: Take 6 tablets (15 mg total) by mouth once a week. 6 tablets weekly) 20 tablet 2    folic acid 1 MG Oral Tab Take 1 tablet (1 mg total) by mouth daily. 90 tablet 1    HYDROCHLOROTHIAZIDE 12.5 MG Oral Tab TAKE 1 TABLET BY MOUTH EVERY DAY (Patient taking differently: Take 1 tablet (12.5 mg total) by mouth as needed.) 90 tablet 1    Terazosin HCl 2 MG Oral Cap Take 1 capsule (2 mg total) by mouth nightly.      Vitamin D3, Cholecalciferol, 50 MCG (2000 UT) Oral Cap Take 1 capsule (2,000 Units total) by mouth daily.      Turmeric 450 MG Oral Cap Take 1 tablet by mouth daily.      Ascorbic Acid (VITAMIN C) 1000 MG Oral Tab Take 1 tablet (1,000 mg total) by mouth daily.      lisinopril 20 MG Oral Tab Take 1 tablet (20 mg total) by mouth daily.      Rosuvastatin Calcium 5 MG Oral Tab Take 1 tablet (5 mg total) by mouth nightly. 90 tablet 1    omeprazole 20 MG Oral Capsule Delayed Release Take 1 capsule (20 mg total) by mouth. As needed once every other day      Vitamin B-12 1000 MCG Oral Tab Take 1 tablet (1,000 mcg total) by mouth daily.      Metoprolol Succinate ER (TOPROL XL) 25 MG Oral Tablet 24 Hr Take 0.5 tablets (12.5 mg total) by mouth daily.      VIAGRA 100 MG Oral Tab Take 1 tablet (100 mg total) by mouth daily as needed.        No current facility-administered medications for this visit.       (Not in a hospital admission)      Review of Systems:     Constitutional: Negative for chills, , fatigue, fever and unexpected weight change.    HENT: Negative for congestion, and mouth sores.    Eyes: Negative for photophobia, pain, redness and visual disturbance.    Respiratory: Negative for apnea, cough, chest tightness, shortness of breath, wheezing and stridor.    Cardiovascular: Negative for chest pain, palpitations and leg swelling.    Gastrointestinal: Negative for abdominal distention, abdominal pain, blood in stool, constipation, diarrhea and  nausea.    Endocrine: Negative.     Genitourinary: Negative for decreased urine volume, difficulty urinating, dyspareunia, dysuria, flank pain, and frequency.    Musculoskeletal: Occasional arthralgias, +gait problem from foot drop and NO joint swelling.    Skin: Negative for color change, pallor and rash. No raynauds or digital ulcerations no sclerodactly.    Allergic/Immunologic: Negative.    Neurological: Negative for dizziness, tremors, seizures, syncope, speech difficulty, weakness, light-headedness, numbness and headaches.    Hematological: Does not bruise/bleed easily.    Psychiatric/Behavioral: Negative for confusion, decreased concentration, hallucinations, self-injury, sleep disturbance and suicidal ideas or depression.    Objective:   Vitals:    09/18/24 1044   BP: 120/82   Pulse: 68   Resp: 16   Temp: 97.6 °F (36.4 °C)          Constitutional: is oriented to person, place, and time. Appears well-developed and well-nourished. No distress.    HEENT: Normocephalic; EOMI; no jvd; no LAD; no oral or nasal ulcers.     Eyes: Conjunctivae and EOM are normal. Pupils are equal, round, and reactive to light.     Neck: Normal range of motion. No thyromegaly present.    Cardiovascular: RRR, no murmurs.    Lungs: Clear, Bilateral air entry, no wheezes.    Abdominal: Soft.    Musculoskeletal:    There is currently no information documented on the homunculus. Go to the Rheumatology activity and [facility]     Joint Exam 09/18/2024     No joint exam has been documented for this visit        Swollen: -- 0    Tender: -- 0        Right shoulder: Exhibits normal range of motion on abduction and internal rotation, no tenderness, no bony tenderness, no deformity, no laceration, no pain and no spasm.        Left shoulder: Exhibits normal range of motion on abduction and internal and external rotation.  no tenderness, no bony tenderness, no swelling, no effusion, no deformity, no pain, no spasm and  normal strength.        Right elbow:  Exhibits normal range of motion, no swelling, no effusion and no deformity. No tenderness found. No medial epicondyle, no lateral epicondyle and no olecranon process tenderness noted. There are no contractures or tophi or nodules.        Left elbow:  Normal range of motion, no swelling, no effusion and no deformity. No medial epicondyle, no lateral epicondyle and no olecranon process tenderness noted. There are no contractures or tophi or nodules.        Right wrist:  Exhibits normal range of motion, no tenderness, no bony tenderness, no swelling, no effusion and no crepitus. Flexion and extension intact w/o limitation.        Left wrist: Exhibits normal range of motion, no tenderness, no bony tenderness, no swelling, no effusion, no crepitus and no deformity. Flexion and extension intact without limitation.        Right hip: Exhibits normal range of motion, normal strength, no tenderness, no bony tenderness, no swelling and no crepitus.        Right hand: No synovitis of MCP,PIP or DIP joints; no Bouchards there are scattered Heberden nodules noted;  strength: 100%.  Mild squaring first CMC joint        Left hand: No synovitis of MCP,PIP or DIP joints; no Bouchards there are scattered Heberden nodules noted;  strength: 100%.  Mild squaring first CMC joint        Left hip: Exhibits normal range of motion, normal strength, no tenderness, no bony tenderness, No swelling and no crepitus.        Right knee: Exhibits normal range of motion, no swelling, no effusion, no ecchymosis, no deformity and no erythema. No tenderness found. No medial joint line, no lateral joint line, no MCL and no LCL tenderness noted. mild crepitation on flexion of knee and extension normal.        Left knee:  Exhibits normal range of motion, no swelling, no effusion, no ecchymosis and no erythema. No tenderness found. No medial joint line, no lateral joint line and no patellar tendon tenderness  noted. mild crepitation on flexion of the knee. Extension intact and normal.        Right ankle: No swelling, no deformity. No tenderness. Dorsiflexion and plantar flexion intact without limitation in range of motion.        Left ankle: Exhibits no swelling. No tenderness. No lateral malleolus and no medial malleolus tenderness found. Achilles tendon normal. Achilles tendon exhibits no pain, no defect and normal Moss's test results.  Dorsiflexion and plantar flexion intact without limitation in range of motion.        Cervical back: Exhibits normal range of motion, no tenderness, no bony tenderness, no swelling, no pain and no spasm.        Thoracic back: Exhibits normal range of motion, no tenderness, no bony tenderness and no spasm.        Lumbar back:  Exhibits normal range of motion, no tenderness, no bony tenderness, no pain and no spasm.        Right foot: normal. There is normal range of motion, no tenderness, no bony tenderness, no crepitus and no laceration. There is no synovitis or tenderness of the MTP joints to palpation.  Bony enlargement MTP joint        Left foot: normal. There is normal range of motion, no tenderness, no bony tenderness and no crepitus. There is no synovitis or tenderness of the MTP joints to palpation.  Bony enlargement MTP joints    Lymphadenopathy: No submental, no submandibular, and no occipital adenopathy present, has no cervical adenopathy or axillary lympadenopathy.    Neurological: Alert and oriented. No focal motor or sensory abnormalities. Strength is 5/5 Upper Extremities/Lower Extremities proximally and distall  Noted mild foot drop right leg right foot/3+4 plantarflexion and dorsiflexion    Skin: Skin is warm, dry and intact.  No rashes no purpuric petechial lesions    Psychiatric: Normal behavior.    Results:    Labs:      Lab Results   Component Value Date    WBC 6.3 03/18/2024    RBC 4.18 03/18/2024    HGB 13.6 03/18/2024    HCT 41.1 03/18/2024    MCV 98.3  03/18/2024    MCH 32.5 03/18/2024    MCHC 33.1 03/18/2024    RDW 13.4 03/18/2024    .0 03/18/2024       No components found for: \"RELY\", \"NMET\", \"MYEL\", \"PROMY\", \"BRUNO\", \"ABSNEUTS\", \"ABSBANDS\", \"ABMM\", \"ABMY\", \"ABPM\", \"ABBL\"      Lab Results   Component Value Date     03/18/2024    K 4.3 03/18/2024    CO2 27.0 03/18/2024    BUN 15 03/18/2024    ALB 3.7 03/18/2024    AST 11 (L) 03/18/2024    ALT 23 03/18/2024          No components found for: \"ESRWESTERGRN\"       No results found for: \"CRP\"      No results found for: \"COLOR\", \"CLARITY\", \"UROBILINOGEN\", \"YEAST\"  @LABRCNTIP(RF,B12)@      [unfilled]    Imaging:  No results found.    Assessment & Plan:      79-year-old woman comes in for reevaluation for:    Seronegative rheumatoid arthritis  Generalized osteoarthritis multiple joints  History of chronic neck pain and stenosis and arthritis  High drug risk monitoring  Liver nodules of unclear etiology followed by PCP  Status post cervical and lumbar fusion with last surgery 4 months ago and improvement in neuropathies  History of lumbar stenosis DJD and disc bulges followed by pain management  Revision of right hip surgery September 2022  Right foot drop improving status postsurgery followed by spine surgeo doing physical therapy    Patient has no joint swelling or synovitis on exam    X-rays of the hands and feet show stable degenerative osteoarthritis 2017    Will need to get the last bone density and x-rays of the hands at [facility]    Apparently did get the DEXA scan given to him September 2023 at [facility].  Would like to obtain these results    Last vectra was 35    Patient remained on methotrexate 15 mg a week, folate acid 1 mg daily    Will reduce methotrexate to 12.5 mg once a week    Need to obtain the labs done August 2024 per patient report    New standing labs given to patient to monitor for drug toxicity from methotrexate.  Reminded patient importance of doing so at  this time    Continue monitoring labs every 8-12 weeks for drug toxicity.       Discussed importance of making sure he gets methotrexate from us and gets labs on time for safety reasons    He is followed by [facility] neurosurgery with recent cervical laminectomy and improvement. He is also aware he has lumbar stenosis and claudication. He is not interested in surgery as of yet.    Head CT showing moderate severe stenosis likely causing some of this pain    We have initiated gabapentin but this is now taken over by pain management and he has up to 5 tablets a day with improvement along with physical therapy    He is off Plavix and now on baby aspirin       Education and counseling provided to patient.  Instructed patient to call my office or seek medical attention immediately if symptoms worsen. Risks and side effects of medications and diagnosis discussed in detail and patient was given written information on new prescribed medications.    Return to clinic:  Return in about 6 months (around 3/18/2025).    Julee Vyas MD  3/18/2024

## 2024-09-18 NOTE — PATIENT INSTRUCTIONS
OSTEOARTHRITIS    Fast Facts    Though some of the joint changes are irreversible, most patients will not need joint replacement surgery.    OA symptoms (what you feel) can vary greatly among patients.    A rheumatologist can detect arthritis and prescribe the proper treatment. The goal of treatment in OA is to reduce pain and improve function.    Exercise is an important part of OA treatment, because it can decrease joint pain and improve function.    At present, there is no treatment that can reverse the damage of OA in the joints. Researchers are trying to find ways to slow or reverse this joint damage.    Osteoarthritis (also known as OA) is a common joint disease that most often affects middle-age to elderly people. It is commonly referred to as \"wear and tear\" of the joints, but we now know that OA is a disease of the entire joint, involving the cartilage, joint lining, ligaments, and bone. Although it is more common in older people, it is not really accurate to say that the joints are just \"wearing out.\" It is characterized by breakdown of the cartilage (the tissue that cushions the ends of the bones between joints), bony changes of the joints, deterioration of tendons and ligaments, and various degrees of inflammation of the joint lining (called the synovium).    This arthritis tends to occur in the hand joints, spine, hips, knees, and great toes. The lifetime risk of developing OA of the knee is about 46%, and the lifetime risk of developing OA of the hip is 25%, according to the [facility] Osteoarthritis Project, a long-term study from the [facility] and sponsored by the Centers for Disease Control and Prevention (often called the CDC) and the National Institutes of Health.    OA is a top cause of disability in older people. The goal of osteoarthritis treatment is to reduce pain and improve function. There is no cure for the disease, but some treatments attempt to slow disease  progression.         What is osteoarthritis?    OA is a frequently slowly progressive joint disease typically seen in middle-aged to elderly people. In osteoarthritis, the cartilage between the bones in the joint breaks down. This causes the affected bones to slowly get bigger. The joint cartilage often breaks down because of mechanical stress or biochemical changes within the body, causing the bone underneath to fail. OA can occur together with other types of arthritis, such as gout or rheumatoid arthritis.    OA tends to affect commonly used joints such as the hands and spine, and the weight-bearing joints such as the hips and knees. Symptoms include:    Joint pain and stiffness    Knobby swelling at the joint    Cracking or grinding noise with joint movement    Decreased function of the joint    How do you treat osteoarthritis?    There is no proven treatment yet that can reverse joint damage from OA. The goal of osteoarthritis treatment is to reduce pain and improve function of the affected joints. Most often, this is possible with a mixture of physical measures and drug therapy and, sometimes, surgery.    Physical measures: Weight loss and exercise are useful in OA. Excess weight puts stress on your knee joints and hips and low back. For every 10 pounds of weight you lose over 10 years, you can reduce the chance of developing knee OA by up to 50 percent. Exercise can improve your muscle strength, decrease joint pain and stiffness, and lower the chance of disability due to OA. Also helpful are support (\"assistive\") devices, such as orthotics or a walking cane, that help you do daily activities. Heat or cold therapy can help relieve OA symptoms for a short time.    Certain alternative treatments such as spa (hot tub), massage, and chiropractic manipulation can help relieve pain for a short time. They can be costly, though, and require repeated treatments. Also, the long-term benefits of these alternative  (sometimes called complementary or integrative) medicine treatments are unproven but are under study.    Drug therapy: Forms of drug therapy include topical, oral (by mouth) and injections (shots). You apply topical drugs directly on the skin over the affected joints. These medicines include capsaicin cream, lidocaine and diclofenac gel. Oral pain relievers such as acetaminophen are common first treatments. So are nonsteroidal anti-inflammatory drugs (often called NSAIDs), which decrease swelling and pain.    In 2010, the government (FDA) approved the use of duloxetine (Cymbalta) for chronic (long-term) musculoskeletal pain including from OA. This oral drug is not new. It also is in use for other health concerns, such as mood disorders, nerve pain and fibromyalgia.    Patients with more serious pain may need stronger medications, such as prescription narcotics.    Joint injections with corticosteroids (sometimes called cortisone shots) or with a form of lubricant called hyaluronic acid can give months of pain relief from OA. This lubricant is given in the knee, and these shots may help delay the need for a knee replacement by a few years in some patients.    Surgery: Surgical treatment becomes an option for severe cases. This includes when the joint has serious damage, or when medical treatment fails to relieve pain and you have major loss of function. Surgery may involve arthroscopy, repair of the joint done through small incisions (cuts). If the joint damage cannot be repaired, you may need a joint replacement.    Supplements: Many over-the-counter nutrition supplements have been used for osteoarthritis treatment. Most lack good research data to support their effectiveness and safety. Among the most widely used are calcium, vitamin D and omega-3 fatty acids. To ensure safety and avoid drug interactions, consult your doctor or pharmacist before using any of these supplements. This is especially true when you are  combining these supplements with prescribed

## 2025-03-18 ENCOUNTER — OFFICE VISIT (OUTPATIENT)
Dept: RHEUMATOLOGY | Facility: CLINIC | Age: 80
End: 2025-03-18
Payer: COMMERCIAL

## 2025-03-18 ENCOUNTER — LAB ENCOUNTER (OUTPATIENT)
Dept: LAB | Age: 80
End: 2025-03-18
Attending: INTERNAL MEDICINE
Payer: MEDICARE

## 2025-03-18 VITALS
OXYGEN SATURATION: 97 % | HEIGHT: 73 IN | RESPIRATION RATE: 16 BRPM | WEIGHT: 204 LBS | BODY MASS INDEX: 27.04 KG/M2 | SYSTOLIC BLOOD PRESSURE: 116 MMHG | DIASTOLIC BLOOD PRESSURE: 60 MMHG | TEMPERATURE: 98 F | HEART RATE: 74 BPM

## 2025-03-18 DIAGNOSIS — Z96.642 HISTORY OF LEFT HIP REPLACEMENT: ICD-10-CM

## 2025-03-18 DIAGNOSIS — Z96.652 HISTORY OF LEFT KNEE REPLACEMENT: ICD-10-CM

## 2025-03-18 DIAGNOSIS — M15.0 PRIMARY OSTEOARTHRITIS INVOLVING MULTIPLE JOINTS: ICD-10-CM

## 2025-03-18 DIAGNOSIS — M06.09 RHEUMATOID ARTHRITIS OF MULTIPLE SITES WITH NEGATIVE RHEUMATOID FACTOR (HCC): ICD-10-CM

## 2025-03-18 DIAGNOSIS — M06.09 RHEUMATOID ARTHRITIS OF MULTIPLE SITES WITH NEGATIVE RHEUMATOID FACTOR (HCC): Primary | ICD-10-CM

## 2025-03-18 DIAGNOSIS — M48.062 SPINAL STENOSIS OF LUMBAR REGION WITH NEUROGENIC CLAUDICATION: ICD-10-CM

## 2025-03-18 DIAGNOSIS — Z79.899 ENCOUNTER FOR DRUG THERAPY: ICD-10-CM

## 2025-03-18 DIAGNOSIS — M47.12 OSTEOARTHRITIS OF CERVICAL SPINE WITH MYELOPATHY: ICD-10-CM

## 2025-03-18 PROBLEM — Z79.01 ANTICOAGULANT LONG-TERM USE: Status: RESOLVED | Noted: 2021-06-14 | Resolved: 2025-03-18

## 2025-03-18 PROBLEM — M16.11 PRIMARY OSTEOARTHRITIS OF RIGHT HIP: Status: RESOLVED | Noted: 2021-06-14 | Resolved: 2025-03-18

## 2025-03-18 LAB
ALBUMIN SERPL-MCNC: 4.5 G/DL (ref 3.2–4.8)
ALBUMIN/GLOB SERPL: 2.4 {RATIO} (ref 1–2)
ALP LIVER SERPL-CCNC: 92 U/L
ALT SERPL-CCNC: 15 U/L
ANION GAP SERPL CALC-SCNC: 8 MMOL/L (ref 0–18)
AST SERPL-CCNC: 19 U/L (ref ?–34)
BASOPHILS # BLD AUTO: 0.03 X10(3) UL (ref 0–0.2)
BASOPHILS NFR BLD AUTO: 0.4 %
BILIRUB SERPL-MCNC: 0.8 MG/DL (ref 0.2–1.1)
BUN BLD-MCNC: 14 MG/DL (ref 9–23)
CALCIUM BLD-MCNC: 9.7 MG/DL (ref 8.7–10.6)
CHLORIDE SERPL-SCNC: 105 MMOL/L (ref 98–112)
CO2 SERPL-SCNC: 28 MMOL/L (ref 21–32)
CREAT BLD-MCNC: 1.05 MG/DL
EGFRCR SERPLBLD CKD-EPI 2021: 73 ML/MIN/1.73M2 (ref 60–?)
EOSINOPHIL # BLD AUTO: 0.06 X10(3) UL (ref 0–0.7)
EOSINOPHIL NFR BLD AUTO: 0.9 %
ERYTHROCYTE [DISTWIDTH] IN BLOOD BY AUTOMATED COUNT: 12.6 %
ERYTHROCYTE [SEDIMENTATION RATE] IN BLOOD: 4 MM/HR
FASTING STATUS PATIENT QL REPORTED: YES
GLOBULIN PLAS-MCNC: 1.9 G/DL (ref 2–3.5)
GLUCOSE BLD-MCNC: 89 MG/DL (ref 70–99)
HCT VFR BLD AUTO: 44.4 %
HGB BLD-MCNC: 14.3 G/DL
IMM GRANULOCYTES # BLD AUTO: 0.02 X10(3) UL (ref 0–1)
IMM GRANULOCYTES NFR BLD: 0.3 %
LYMPHOCYTES # BLD AUTO: 1.34 X10(3) UL (ref 1–4)
LYMPHOCYTES NFR BLD AUTO: 20 %
MCH RBC QN AUTO: 31.9 PG (ref 26–34)
MCHC RBC AUTO-ENTMCNC: 32.2 G/DL (ref 31–37)
MCV RBC AUTO: 99.1 FL
MONOCYTES # BLD AUTO: 0.52 X10(3) UL (ref 0.1–1)
MONOCYTES NFR BLD AUTO: 7.7 %
NEUTROPHILS # BLD AUTO: 4.74 X10 (3) UL (ref 1.5–7.7)
NEUTROPHILS # BLD AUTO: 4.74 X10(3) UL (ref 1.5–7.7)
NEUTROPHILS NFR BLD AUTO: 70.7 %
OSMOLALITY SERPL CALC.SUM OF ELEC: 292 MOSM/KG (ref 275–295)
PLATELET # BLD AUTO: 261 10(3)UL (ref 150–450)
POTASSIUM SERPL-SCNC: 4.7 MMOL/L (ref 3.5–5.1)
PROT SERPL-MCNC: 6.4 G/DL (ref 5.7–8.2)
RBC # BLD AUTO: 4.48 X10(6)UL
SODIUM SERPL-SCNC: 141 MMOL/L (ref 136–145)
URATE SERPL-MCNC: 5.7 MG/DL
VIT D+METAB SERPL-MCNC: 44.9 NG/ML (ref 30–100)
WBC # BLD AUTO: 6.7 X10(3) UL (ref 4–11)

## 2025-03-18 PROCEDURE — 80053 COMPREHEN METABOLIC PANEL: CPT

## 2025-03-18 PROCEDURE — 84153 ASSAY OF PSA TOTAL: CPT

## 2025-03-18 PROCEDURE — 82306 VITAMIN D 25 HYDROXY: CPT

## 2025-03-18 PROCEDURE — 36415 COLL VENOUS BLD VENIPUNCTURE: CPT

## 2025-03-18 PROCEDURE — 85652 RBC SED RATE AUTOMATED: CPT

## 2025-03-18 PROCEDURE — 84154 ASSAY OF PSA FREE: CPT

## 2025-03-18 PROCEDURE — 84550 ASSAY OF BLOOD/URIC ACID: CPT

## 2025-03-18 PROCEDURE — 99214 OFFICE O/P EST MOD 30 MIN: CPT | Performed by: INTERNAL MEDICINE

## 2025-03-18 PROCEDURE — 85025 COMPLETE CBC W/AUTO DIFF WBC: CPT

## 2025-03-18 RX ORDER — CHOLECALCIFEROL (VITAMIN D3) 50 MCG
2000 TABLET ORAL DAILY
COMMUNITY

## 2025-03-18 RX ORDER — DULOXETIN HYDROCHLORIDE 30 MG/1
30 CAPSULE, DELAYED RELEASE ORAL 2 TIMES DAILY
Qty: 60 CAPSULE | Refills: 3 | Status: SHIPPED | OUTPATIENT
Start: 2025-03-18

## 2025-03-18 NOTE — PROGRESS NOTES
[facility], [address] [place] [address]      Consult     Ramiro Block Patient Status:  No patient class for patient encounter    6/3/1945 MRN [id number]   Location [facility], [address] [place] [address] Attending No att. providers found   Hosp Day # 0 PCP GIL GANN     Referring Provider: MIMI    Reason for Consultation: Rheumatoid arthritis seronegative; osteoarthritis; chronic back pain neuropathy    Subjective:    Ramiro Block is a 79 year old  male comes in for reevaluation for seronegative rheumatoid arthritis and generalized osteoarthritis.     Patient was last seen in clinic 2024    He was going through some issues with significant back pain and neuropathy    We had offered him gabapentin which he had started and taken over by a pain specialist at [facility]    He is now up to 3 tablets a day around 900 mg a day    At that time because of all his pain we continued methotrexate 15 mg a week     He tried to wean down to 12.5 but noticed increased arthralgias and went back up    It is not getting his labs and getting his medications through another provider    Discussed importance of making sure he is not compliant with the labs to avoid drug toxicity    He has had several MRIs with recent one reviewed MRI of the lumbar spine from 2025 showing multiple areas of disc bulges he has chronic neuropathy and stenosis and neurogenic claudication    He is considering surgery but is seeking another opinion before proceeding    Patient's wife is accompanying him today and he has noticed decreased appetite and some depression denies any suicidal ideation.  We have discussed considering addition of Cymbalta to his regimen which they are open to 30 mg daily for 4 weeks and increasing to 60 mg as tolerated.  Discussed it will take 6 to 8 weeks to take some effect    Patient is unable to tolerate this he will discuss further options with PCP    He has chronic  issues since October 2023 he had sudden foot drop and weakness likely from findings found on MRI of a collapse of the vertebra.  He was not interested in surgery and is doing physical therapy aggressively with some improvement.  He is slowly improving     He has seen several second opinions who suggested no surgery for at least a year but now is considering this    We had given him an order to check a bone density March 2024.  He is not sure if he did it at [facility]     Will try obtain these results if not he will get this done     States no swelling of his joints     He has not had any worsening symptoms    Patient states his PCP had filled his methotrexate and he did not get his labs in between visits.  Reminded patient the importance of getting methotrexate labs to monitor for drug toxicity new standing labs given to patient today    Unfortunately in 2022 he had revision surgery of his right hip since it gave out on 2 occasions. He also had lumbar surgery about a year ago    Around that time he had CT of the lower spine reviewed with patient today showing areas of stenosis arthritis and disc bulging    His second revision was September 2022    He is taking his meloxicam.     About a year ago he did get cervical neck fusion    He is using a walker because of instability of his lower extremities and pain    States no flareups of the rheumatoid arthritis or joint swelling that is concerning.    He did have recent cardiac clearance and chest x-ray which was unremarkable in September 2021    He did have some mild edema of his lower extremities where he was placed on Lasix with improvement.    States no swelling of his joints otherwise. He did see his neurosurgeon who did repeat another MRI of the cervical neck which showed stable stenosis and arthritis in 2017. H    Previously in September 2021 he did get    Cervical laminectomy because of balance issues and worsening neck pain with improvement  overall    Denies any urinary bowel incontinence     States no swelling of his joints otherwise. States no infections    He did get his vaccinations for coronavirus without any side effects or problem    His knee replacement of the left knee within about 6 years and continues to do well.     Bone density was done 2018 and reviewed with patient normal density improvement in T-scores of lumbar spine and hip past years.     Denies any swelling of his joints. No shortness of breath chest pain fevers or chills.     X-rays of the hands and feet upddated December 2017 showing stable and osteoarthritis. vectra testing was stable at 35.    he denies any swelling in his joints overall pain levels are minimal to none except for occasional back pain.     his weight has been stable denies any headaches to claudication vision changes.    He denies any infections since last visit. He is not taking meloxicam regularly.     They have Taken him off Plavix as well     He understands the potential interaction between both.    History/Other:      Past Medical History:  Past Medical History:    Esophageal reflux    Essential hypertension    Heart attack (HCC)    High blood pressure    High cholesterol    Hyperlipidemia    Osteoarthrosis, unspecified whether generalized or localized, unspecified site    bilateral knees.     RBBB (right bundle branch block)    Tachycardia    TIA (transient ischemic attack)        Past Surgical History:   Past Surgical History:   Procedure Laterality Date    Angiogram  08/2018    Back surgery N/A 04/2022    Cervical laminoplasty  09/2021    [facility]     Hip replacement surgery      Knee replacement surgery      Other surgical history  1970    left knee cartilege removal    Other surgical history  2004    right knee        Social History:  reports that he has quit smoking. His smoking use included cigarettes. He has never used smokeless tobacco. He reports current alcohol use of about 6.0  standard drinks of alcohol per week. He reports that he does not use drugs.    Family History: History reviewed. No pertinent family history.    Allergies:   Allergies   Allergen Reactions    Diclofenac UNKNOWN     Water retention / weigh gain    Voltaren [Diclofenac Sodium] SWELLING     Retaining water, sob.        Current Medications:  Current Outpatient Medications   Medication Sig Dispense Refill    cholecalciferol 50 MCG (2000 UT) Oral Tab Take 1 tablet (2,000 Units total) by mouth daily.      methotrexate 2.5 MG Oral Tab Take 5 tablets (12.5 mg total) by mouth once a week. 20 tablet 0    gabapentin 300 MG Oral Cap Take 1 capsule (300 mg total) by mouth in the morning, at noon, and at bedtime. TID      folic acid 1 MG Oral Tab Take 1 tablet (1 mg total) by mouth daily. 90 tablet 1    HYDROCHLOROTHIAZIDE 12.5 MG Oral Tab TAKE 1 TABLET BY MOUTH EVERY DAY (Patient taking differently: Take 1 tablet (12.5 mg total) by mouth. As needed) 90 tablet 1    Terazosin HCl 2 MG Oral Cap Take 1 capsule (2 mg total) by mouth nightly.      Turmeric 450 MG Oral Cap Take 1 tablet by mouth daily.      Ascorbic Acid (VITAMIN C) 1000 MG Oral Tab Take 1 tablet (1,000 mg total) by mouth daily.      lisinopril 20 MG Oral Tab Take 1 tablet (20 mg total) by mouth daily.      Rosuvastatin Calcium 5 MG Oral Tab Take 1 tablet (5 mg total) by mouth nightly. 90 tablet 1    omeprazole 20 MG Oral Capsule Delayed Release Take 1 capsule (20 mg total) by mouth. As needed once every other day      Vitamin B-12 1000 MCG Oral Tab Take 1 tablet (1,000 mcg total) by mouth daily.      Metoprolol Succinate ER (TOPROL XL) 25 MG Oral Tablet 24 Hr Take 0.5 tablets (12.5 mg total) by mouth daily.      VIAGRA 100 MG Oral Tab Take 1 tablet (100 mg total) by mouth daily as needed.        No current facility-administered medications for this visit.       (Not in a hospital admission)      Review of Systems:     Constitutional: Negative for chills, , fatigue,  fever and unexpected weight change.    HENT: Negative for congestion, and mouth sores.    Eyes: Negative for photophobia, pain, redness and visual disturbance.    Respiratory: Negative for apnea, cough, chest tightness, shortness of breath, wheezing and stridor.    Cardiovascular: Negative for chest pain, palpitations and leg swelling.    Gastrointestinal: Negative for abdominal distention, abdominal pain, blood in stool, constipation, diarrhea and nausea.    Endocrine: Negative.     Genitourinary: Negative for decreased urine volume, difficulty urinating, dyspareunia, dysuria, flank pain, and frequency.    Musculoskeletal: Occasional arthralgias, +gait problem from foot drop and NO joint swelling.    Skin: Negative for color change, pallor and rash. No raynauds or digital ulcerations no sclerodactly.    Allergic/Immunologic: Negative.    Neurological: Negative for dizziness, tremors, seizures, syncope, speech difficulty, weakness, light-headedness, numbness and headaches.    Hematological: Does not bruise/bleed easily.    Psychiatric/Behavioral: Negative for confusion, decreased concentration, hallucinations, self-injury, sleep disturbance and suicidal ideas or depression.    Objective:   Vitals:    03/18/25 1043   BP: 116/60   Pulse: 74   Resp: 16   Temp: 97.8 °F (36.6 °C)          Constitutional: is oriented to person, place, and time. Appears well-developed and well-nourished. No distress.    HEENT: Normocephalic; EOMI; no jvd; no LAD; no oral or nasal ulcers.     Eyes: Conjunctivae and EOM are normal. Pupils are equal, round, and reactive to light.     Neck: Normal range of motion. No thyromegaly present.    Cardiovascular: RRR, no murmurs.    Lungs: Clear, Bilateral air entry, no wheezes.    Abdominal: Soft.    Musculoskeletal:    There is currently no information documented on the homunculus. Go to the Rheumatology activity and complete the homunculus joint exam.     Joint Exam 03/18/2025     No joint exam  has been documented for this visit        Swollen: -- 0    Tender: -- 0        Right shoulder: Exhibits normal range of motion on abduction and internal rotation, no tenderness, no bony tenderness, no deformity, no laceration, no pain and no spasm.        Left shoulder: Exhibits normal range of motion on abduction and internal and external rotation.  no tenderness, no bony tenderness, no swelling, no effusion, no deformity, no pain, no spasm and normal strength.        Right elbow:  Exhibits normal range of motion, no swelling, no effusion and no deformity. No tenderness found. No medial epicondyle, no lateral epicondyle and no olecranon process tenderness noted. There are no contractures or tophi or nodules.        Left elbow:  Normal range of motion, no swelling, no effusion and no deformity. No medial epicondyle, no lateral epicondyle and no olecranon process tenderness noted. There are no contractures or tophi or nodules.        Right wrist:  Exhibits normal range of motion, no tenderness, no bony tenderness, no swelling, no effusion and no crepitus. Flexion and extension intact w/o limitation.        Left wrist: Exhibits normal range of motion, no tenderness, no bony tenderness, no swelling, no effusion, no crepitus and no deformity. Flexion and extension intact without limitation.        Right hip: Exhibits normal range of motion, normal strength, no tenderness, no bony tenderness, no swelling and no crepitus.        Right hand: No synovitis of MCP,PIP or DIP joints; no Bouchards there are scattered Heberden nodules noted;  strength: 100%.  Mild squaring first CMC joint        Left hand: No synovitis of MCP,PIP or DIP joints; no Bouchards there are scattered Heberden nodules noted;  strength: 100%.  Mild squaring first CMC joint        Left hip: Exhibits normal range of motion, normal strength, no tenderness, no bony tenderness, No swelling and no crepitus.        Right knee: Exhibits normal range of  motion, no swelling, no effusion, no ecchymosis, no deformity and no erythema. No tenderness found. No medial joint line, no lateral joint line, no MCL and no LCL tenderness noted. mild crepitation on flexion of knee and extension normal.        Left knee:  Exhibits normal range of motion, no swelling, no effusion, no ecchymosis and no erythema. No tenderness found. No medial joint line, no lateral joint line and no patellar tendon tenderness noted. mild crepitation on flexion of the knee. Extension intact and normal.        Right ankle: No swelling, no deformity. No tenderness. Dorsiflexion and plantar flexion intact without limitation in range of motion.        Left ankle: Exhibits no swelling. No tenderness. No lateral malleolus and no medial malleolus tenderness found. Achilles tendon normal. Achilles tendon exhibits no pain, no defect and normal Moss's test results.  Dorsiflexion and plantar flexion intact without limitation in range of motion.        Cervical back: Exhibits normal range of motion, no tenderness, no bony tenderness, no swelling, no pain and no spasm.        Thoracic back: Exhibits normal range of motion, no tenderness, no bony tenderness and no spasm.        Lumbar back:  Exhibits normal range of motion, no tenderness, no bony tenderness, no pain and no spasm.        Right foot: normal. There is normal range of motion, no tenderness, no bony tenderness, no crepitus and no laceration. There is no synovitis or tenderness of the MTP joints to palpation.  Bony enlargement MTP joint        Left foot: normal. There is normal range of motion, no tenderness, no bony tenderness and no crepitus. There is no synovitis or tenderness of the MTP joints to palpation.  Bony enlargement MTP joints    Lymphadenopathy: No submental, no submandibular, and no occipital adenopathy present, has no cervical adenopathy or axillary lympadenopathy.    Neurological: Alert and oriented. No focal motor or sensory  abnormalities. Strength is 5/5 Upper Extremities/Lower Extremities proximally and distall  Noted mild foot drop right leg right foot/3+4 plantarflexion and dorsiflexion    Skin: Skin is warm, dry and intact.  No rashes no purpuric petechial lesions    Psychiatric: Normal behavior.    Results:    Labs:      Lab Results   Component Value Date    WBC 6.3 03/18/2024    RBC 4.18 03/18/2024    HGB 13.6 03/18/2024    HCT 41.1 03/18/2024    MCV 98.3 03/18/2024    MCH 32.5 03/18/2024    MCHC 33.1 03/18/2024    RDW 13.4 03/18/2024    .0 03/18/2024       No components found for: \"RELY\", \"NMET\", \"MYEL\", \"PROMY\", \"BRUNO\", \"ABSNEUTS\", \"ABSBANDS\", \"ABMM\", \"ABMY\", \"ABPM\", \"ABBL\"      Lab Results   Component Value Date     03/18/2024    K 4.3 03/18/2024    CO2 27.0 03/18/2024    BUN 15 03/18/2024    ALB 3.7 03/18/2024    AST 11 (L) 03/18/2024    ALT 23 03/18/2024          No components found for: \"ESRWESTERGRN\"       No results found for: \"CRP\"      No results found for: \"COLOR\", \"CLARITY\", \"UROBILINOGEN\", \"YEAST\"  @LABRCNTIP(RF,B12)@      [unfilled]    Imaging:  No results found.  ROCEDURE: MRI LUMBAR SPINE W WO CONTRAST     HISTORY: Foot drop and back pain     COMPARISON: MRI lumbar spine from December 7, 2023, CT lumbar spine from November 6, 2023     TECHNIQUE: Sagittal T1, T2, STIR, axial T1 and T2, and postcontrast axial T1 and sagittal T1 with fat saturation images of the lumbar spine were acquired.     FINDINGS:     There are 5 lumbar-type vertebrae. There is a normal lumbar lordosis. There is minimal grade 1 anterolisthesis of L4-5, similar to prior examination.  There is mild dextro scoliotic curvature of the lumbar spine.  The vertebral body heights are maintained.  There are advanced degenerative endplate marrow signal changes, most notably at L1-L2.     There is evidence of prior L3 and L4 laminectomies, as well as partial resection of the L2 spinous process.  Prominent heterogeneously enhancing tissue seen  within the posterior paraspinal soft tissues at the L3 and L4 levels, likely representing granulation tissue.     Again seen is a T2 hyperintense subdural collection along the dorsal aspect of the spinal canal which extends superiorly from the partially visualized lower thoracic spine to the mid L2 level and measures up to 1.4 x 0.5 cm (series 6 image 1) at its thickest, similar compared to prior examination. As was noted previously, this collection causes mild mass effect on the adjacent cauda equina nerve roots.  The exact cranial extent of this lesion is difficult to assess.     The distal cord and conus medullaris are normal in caliber and signal.  The conus terminates at the T11-T12 level.  As was noted previously, there is a linear enhancing structure coursing along the anterior aspect of the cauda equina nerve roots, likely representing a prominent vascular structure.     There is multilevel disc desiccation and loss of intervertebral disc height, most pronounced at L1-L2, where there is complete osseous bridging of the vertebrae as well as facet ankylosis.     T12-L1: There is an annular bulge and facet arthrosis, as well as posterior subdural collection as above.  These findings cause moderate to severe spinal canal stenosis and mild bilateral neural foraminal stenosis.     L1-L2: There is a posterior osteophyte disc complex as well as posterior subdural collection as described above.  These findings cause moderate spinal canal stenosis and moderate to severe bilateral neural foraminal stenosis     L2-L3: The thecal sac is decompressed posteriorly.  There is a moderate disc bulge and facet arthrosis.  There is moderate to severe right and moderate left neural foraminal stenosis and mild spinal canal stenosis.  There is a posteriorly directed right-sided synovial cyst arising from the right L2-L3 facet joint (series 6 image 20), increased from prior.     L3-L4: The thecal sac is decompressed posteriorly.   There is a moderate to bulge and facet arthrosis.  There is likely a component of small superimposed disc extrusion with slight superior migration (series 3 image 12).  There is moderate spinal canal stenosis but severe left subarticular stenosis as well as moderate to severe bilateral neural foraminal stenosis.     L4-L5: The thecal sac is decompressed posteriorly.  Previously noted large superiorly migrated extruded disc appears largely resolved.  There is a disc bulge and facet arthrosis.  There is a possible small right-sided synovial cyst seen on series 6 image 31, which was not visible on prior examination.  Despite posterior spinal canal decompression, there is mild spinal canal stenosis with moderate bilateral subarticular stenosis, severe right, and moderate to severe left neural foraminal stenosis.     L5-S1: There is a disc bulge and facet arthrosis.  Previously noted possible right-sided synovial cyst at L5-S1 appears nearly completely resolved, with improvement a previously noted spinal canal stenosis at this level.  There is mild spinal canal stenosis and mild bilateral neural foraminal stenosis.     IMPRESSION:     1. Multilevel degenerative disc disease as described above.  Previously noted sequestered disc fragment seen at the L4 level is no longer visible and may have been resorbed.  Additionally, a possible synovial cyst seen at the L5 level has reduced in size.     2.  Stable appearing posterior subdural spinal collection, contributing to spinal stenoses as above.      3.  Otherwise similar appearance of multilevel degenerative disc disease as described.         Assessment & Plan:      79-year-old woman comes in for reevaluation for:    Seronegative rheumatoid arthritis  Generalized osteoarthritis multiple joints  History of chronic neck pain and stenosis and arthritis  High drug risk monitoring  Liver nodules of unclear etiology followed by PCP  Status post cervical and lumbar fusion with last  surgery 4 months ago and improvement in neuropathies  History of lumbar stenosis DJD and disc bulges followed by pain management  Revision of right hip surgery September 2022  Right foot drop improving status postsurgery followed by spine surgeo doing physical therapy  Depression anxiety nonrestorative sleep fatigue    Patient has no joint swelling or synovitis on exam    Continued neuropathy pain likely considering surgery    X-rays of the hands and feet show stable degenerative osteoarthritis 2017    Will need to get the last bone density and x-rays of the hands at [facility]    Apparently did get the DEXA scan given to him September 2023 at [facility].  Would like to obtain these results    Last vectra was 35    Patient remained on methotrexate 15 mg a week, folate acid 1 mg daily    He states he felt more achy going down he would like to remain on current dose    Patient is aware he is overdue for lab and the dangers of not monitoring for drug toxicity appears he is getting it from another provider    New standing labs given to patient to monitor for drug toxicity from methotrexate.  Reminded patient importance of doing so at this time    Continue monitoring labs every 8-12 weeks for drug toxicity.       Discussed importance of making sure he gets methotrexate from us and gets labs on time for safety reasons    He is followed by [facility] neurosurgery with recent cervical laminectomy and improvement. He is also aware he has lumbar stenosis and claudication. He is not interested in surgery as of yet.    Head CT showing moderate severe stenosis likely causing some of this pain    We have initiated gabapentin but this is now taken over by pain management and he has up to 3 tablets a day with improvement along with physical therapy    Offered Cymbalta 30 mg daily for 4 weeks increase to 60 mg as tolerated for depression anxiety chronic back pain neuropathy.  Risk side effects discussed  written information given the patient if he cannot tolerate this he will discuss other options with his PCP    He is off Plavix and now on baby aspirin       Education and counseling provided to patient.  Instructed patient to call my office or seek medical attention immediately if symptoms worsen. Risks and side effects of medications and diagnosis discussed in detail and patient was given written information on new prescribed medications.    Return to clinic:  Return in about 6 months (around 9/18/2025).    Julee Vyas MD  3/18/2024

## 2025-03-18 NOTE — PATIENT INSTRUCTIONS
OSTEOARTHRITIS    Fast Facts    Though some of the joint changes are irreversible, most patients will not need joint replacement surgery.    OA symptoms (what you feel) can vary greatly among patients.    A rheumatologist can detect arthritis and prescribe the proper treatment. The goal of treatment in OA is to reduce pain and improve function.    Exercise is an important part of OA treatment, because it can decrease joint pain and improve function.    At present, there is no treatment that can reverse the damage of OA in the joints. Researchers are trying to find ways to slow or reverse this joint damage.    Osteoarthritis (also known as OA) is a common joint disease that most often affects middle-age to elderly people. It is commonly referred to as \"wear and tear\" of the joints, but we now know that OA is a disease of the entire joint, involving the cartilage, joint lining, ligaments, and bone. Although it is more common in older people, it is not really accurate to say that the joints are just \"wearing out.\" It is characterized by breakdown of the cartilage (the tissue that cushions the ends of the bones between joints), bony changes of the joints, deterioration of tendons and ligaments, and various degrees of inflammation of the joint lining (called the synovium).    This arthritis tends to occur in the hand joints, spine, hips, knees, and great toes. The lifetime risk of developing OA of the knee is about 46%, and the lifetime risk of developing OA of the hip is 25%, according to the [facility] Osteoarthritis Project, a long-term study from the [facility] and sponsored by the Centers for Disease Control and Prevention (often called the CDC) and the National Institutes of Health.    OA is a top cause of disability in older people. The goal of osteoarthritis treatment is to reduce pain and improve function. There is no cure for the disease, but some treatments attempt to slow disease  progression.         What is osteoarthritis?    OA is a frequently slowly progressive joint disease typically seen in middle-aged to elderly people. In osteoarthritis, the cartilage between the bones in the joint breaks down. This causes the affected bones to slowly get bigger. The joint cartilage often breaks down because of mechanical stress or biochemical changes within the body, causing the bone underneath to fail. OA can occur together with other types of arthritis, such as gout or rheumatoid arthritis.    OA tends to affect commonly used joints such as the hands and spine, and the weight-bearing joints such as the hips and knees. Symptoms include:    Joint pain and stiffness    Knobby swelling at the joint    Cracking or grinding noise with joint movement    Decreased function of the joint    How do you treat osteoarthritis?    There is no proven treatment yet that can reverse joint damage from OA. The goal of osteoarthritis treatment is to reduce pain and improve function of the affected joints. Most often, this is possible with a mixture of physical measures and drug therapy and, sometimes, surgery.    Physical measures: Weight loss and exercise are useful in OA. Excess weight puts stress on your knee joints and hips and low back. For every 10 pounds of weight you lose over 10 years, you can reduce the chance of developing knee OA by up to 50 percent. Exercise can improve your muscle strength, decrease joint pain and stiffness, and lower the chance of disability due to OA. Also helpful are support (\"assistive\") devices, such as orthotics or a walking cane, that help you do daily activities. Heat or cold therapy can help relieve OA symptoms for a short time.    Certain alternative treatments such as spa (hot tub), massage, and chiropractic manipulation can help relieve pain for a short time. They can be costly, though, and require repeated treatments. Also, the long-term benefits of these alternative  (sometimes called complementary or integrative) medicine treatments are unproven but are under study.    Drug therapy: Forms of drug therapy include topical, oral (by mouth) and injections (shots). You apply topical drugs directly on the skin over the affected joints. These medicines include capsaicin cream, lidocaine and diclofenac gel. Oral pain relievers such as acetaminophen are common first treatments. So are nonsteroidal anti-inflammatory drugs (often called NSAIDs), which decrease swelling and pain.    In 2010, the government (FDA) approved the use of duloxetine (Cymbalta) for chronic (long-term) musculoskeletal pain including from OA. This oral drug is not new. It also is in use for other health concerns, such as mood disorders, nerve pain and fibromyalgia.    Patients with more serious pain may need stronger medications, such as prescription narcotics.    Joint injections with corticosteroids (sometimes called cortisone shots) or with a form of lubricant called hyaluronic acid can give months of pain relief from OA. This lubricant is given in the knee, and these shots may help delay the need for a knee replacement by a few years in some patients.    Surgery: Surgical treatment becomes an option for severe cases. This includes when the joint has serious damage, or when medical treatment fails to relieve pain and you have major loss of function. Surgery may involve arthroscopy, repair of the joint done through small incisions (cuts). If the joint damage cannot be repaired, you may need a joint replacement.    Supplements: Many over-the-counter nutrition supplements have been used for osteoarthritis treatment. Most lack good research data to support their effectiveness and safety. Among the most widely used are calcium, vitamin D and omega-3 fatty acids. To ensure safety and avoid drug interactions, consult your doctor or pharmacist before using any of these supplements. This is especially true when you are  combining these supplements with prescribed

## 2025-03-19 ENCOUNTER — TELEPHONE (OUTPATIENT)
Dept: RHEUMATOLOGY | Facility: CLINIC | Age: 80
End: 2025-03-19

## 2025-03-19 ENCOUNTER — TELEPHONE (OUTPATIENT)
Facility: CLINIC | Age: 80
End: 2025-03-19

## 2025-03-19 LAB
%FPSA REFLEX: 31.5 %
%FPSA REFLEX: 31.5 %
PROSTATE SPECIFIC AG: 4.7 NG/ML
PROSTATE SPECIFIC AG: 4.7 NG/ML
PSA, FREE: 1.48 NG/ML
PSA, FREE: 1.48 NG/ML

## 2025-03-19 RX ORDER — METHOTREXATE 2.5 MG/1
12.5 TABLET ORAL WEEKLY
Qty: 20 TABLET | Refills: 2 | Status: SHIPPED | OUTPATIENT
Start: 2025-03-19

## 2025-03-19 RX ORDER — FOLIC ACID 1 MG/1
1 TABLET ORAL DAILY
Qty: 90 TABLET | Refills: 1 | Status: SHIPPED | OUTPATIENT
Start: 2025-03-19

## 2025-03-19 NOTE — TELEPHONE ENCOUNTER
Left detailed message on patient's voicemail regarding the below message:    omponent  Ref Range & Units 1 d ago   % Free PSA  % 31.5   Comment: The table below lists the probability of prostate cancer for  men with non-suspicious JOSE FRANCISCO results and total PSA between  4 and 10 ng/mL, by patient age (Dolores et al, ADONIS 1998,  279:1542).                    % Free PSA       50-64 yr        65-75 yr                    0.00-10.00%        56%             55%                   10.01-15.00%        24%             35%                   15.01-20.00%        17%             23%                   20.01-25.00%        10%             20%                        >25.00%         5%              9%  Please note:  Dolores et al did not make specific                recommendations regarding the use of                percent free PSA for any other population                of men.   PSA, Free  N/A ng/mL 1.48   Comment: Roche ECLIA methodology.   Resulting Agency [facility]                Narrative  Component  Ref Range & Units 3/18/25 11:42 AM   Prostate Specific Antigen  0.0 - 4.0 ng/mL 4.7 High    Comment: Roche ECLIA methodology.  According to the American Urological Association, Serum PSA should  decrease and remain at undetectable levels after radical  prostatectomy. The AUA defines b         PSA level was slightly elevated but the reflex appears to be in good range?  I am not sure how to interpret this we will fax this to patient's PCP for him to follo    Patient was asked to call the office back with any questions or concerns he may have. A [employer] message was also send to patient.     Patient's lab results sent to his PCP

## 2025-03-19 NOTE — TELEPHONE ENCOUNTER
Pt called office, requesting we fax lab results to his PCP and Dr. Mg at [facility]. Results routed to providers as requested

## 2025-03-19 NOTE — TELEPHONE ENCOUNTER
omponent  Ref Range & Units 1 d ago   % Free PSA  % 31.5   Comment: The table below lists the probability of prostate cancer for  men with non-suspicious JOSE FRANCISCO results and total PSA between  4 and 10 ng/mL, by patient age (Dolores et al, ADONIS 1998,  279:1542).                    % Free PSA       50-64 yr        65-75 yr                    0.00-10.00%        56%             55%                   10.01-15.00%        24%             35%                   15.01-20.00%        17%             23%                   20.01-25.00%        10%             20%                        >25.00%         5%              9%  Please note:  Dolores et al did not make specific                recommendations regarding the use of                percent free PSA for any other population                of men.   PSA, Free  N/A ng/mL 1.48   Comment: Roche ECLIA methodology.   Resulting Agency [facility]              Narrative  Component  Ref Range & Units 3/18/25 11:42 AM   Prostate Specific Antigen  0.0 - 4.0 ng/mL 4.7 High    Comment: Roche ECLIA methodology.  According to the American Urological Association, Serum PSA should  decrease and remain at undetectable levels after radical  prostatectomy. The AUA defines b       PSA level was slightly elevated but the reflex appears to be in good range?  I am not sure how to interpret this we will fax this to patient's PCP for him to follow-up unless he has a urologist we want to fax this to

## 2025-03-20 ENCOUNTER — TELEPHONE (OUTPATIENT)
Facility: CLINIC | Age: 80
End: 2025-03-20

## 2025-03-20 NOTE — TELEPHONE ENCOUNTER
Pt called office regarding the Cymbalta. Pt has started the medication took yesterday and agin this am. Co lightheadedness after taking, which caused him to have to sit down. BP monitored and WNL. Pt would like Dr. Vyas to be aware.

## 2025-03-21 NOTE — TELEPHONE ENCOUNTER
Phoned pt, explained that Dr. Vyas responded to his concerns. \"If he is intolerable to medication he needs to stop it if he thinks it is improving and symptoms improve he can try taking it again  But if he has significant dizziness and lightheadedness he needs to stop\"  Pt voiced understanding of all reviewed.

## 2025-07-08 RX ORDER — DULOXETIN HYDROCHLORIDE 30 MG/1
30 CAPSULE, DELAYED RELEASE ORAL 2 TIMES DAILY
Qty: 60 CAPSULE | Refills: 0 | Status: SHIPPED | OUTPATIENT
Start: 2025-07-08

## 2025-07-08 NOTE — TELEPHONE ENCOUNTER
Received fax from pharmacy, requesting for Duloxetine 30 mg daily.       Last office visit: 3/18/2025    Next Rheum Apt:9/18/2025 Julee Vyas MD    Last fill: 3/18/2025, Quantity 60, Refills 3    Labs:   Lab Results   Component Value Date    CREATSERUM 1.05 03/18/2025    ALKPHO 92 03/18/2025    AST 19 03/18/2025    ALT 15 03/18/2025    BILT 0.8 03/18/2025    TP 6.4 03/18/2025    ALB 4.5 03/18/2025       Lab Results   Component Value Date    WBC 6.7 03/18/2025    HGB 14.3 03/18/2025    .0 03/18/2025    NEPRELIM 4.74 03/18/2025    NEPERCENT 70.7 03/18/2025    LYPERCENT 20.0 03/18/2025    NE 4.74 03/18/2025    LYMABS 1.34 03/18/2025        Provider out of the office, routed to staff covering for further approval.

## 2025-08-28 DIAGNOSIS — M06.09 RHEUMATOID ARTHRITIS OF MULTIPLE SITES WITH NEGATIVE RHEUMATOID FACTOR (HCC): Primary | ICD-10-CM

## 2025-08-29 RX ORDER — DULOXETIN HYDROCHLORIDE 30 MG/1
CAPSULE, DELAYED RELEASE ORAL
Qty: 90 CAPSULE | Refills: 1 | Status: SHIPPED | OUTPATIENT
Start: 2025-08-29

## (undated) NOTE — LETTER
[address] 39   Date:   7/14/2021     Name:   Irlanda Hall    YOB: 1945   MRN:   [id number]       WHERE IS YOUR PAIN NOW? Cly[place] the areas on your body where you feel the described sensations.   Use the appropriate sy

## (undated) NOTE — LETTER
[address] [address]   Date:   6/14/2021     Name:   Kim Ferguson    YOB: 1945   MRN:   [id number]       WHERE IS YOUR PAIN NOW? Clyde the areas on your body where you feel the described sensations.

## (undated) NOTE — LETTER
[address]el [address]   Date:   8/13/2021     Name:   Piter Ramirez    YOB: 1945   MRN:   [id number]       WHERE IS YOUR PAIN NOW? [place] the areas on your body where you feel the described sensations.   Use the appropriate sy

## (undated) NOTE — Clinical Note
Dear Vineet Martinez,    Thank you for sending Hallie Fan to see me for physiatry consultation. I appreciate your confidence in me to care for your patients. Please feel free call me with any questions at [phone] or contact me through [facility]     Sincerely,

## (undated) NOTE — Clinical Note
Dear Kirk Rocha,    I had the opportunity to see your patient Rubi Pandey recently. I am sending you this update, and I appreciate your confidence in me to care for your patients.  Please feel free call me with any questions at [phone] or contact me t

## (undated) NOTE — LETTER
21        To Whom It May Concern:      fito Villegas  :  6/3/1945    []  Patient has been cleared to hold Plavix (7days) for L5-S1 interlaminar epidural steroid injection.   Holding the medication(s) may put the patient at an increased risk for

## (undated) NOTE — LETTER
September 25, 2023         Destiny Vargas, [address], [address]      Patient: Marium Bui   YOB: 1945   Date of Visit: 9/25/2023       Dear Dr. Sharon Kraft saw your patient, Marium Bui, on 9/25/2023. Enclosed is my consultation / progress note from that encounter. Thank you for allowing me to participate in the care of this patient.     Sincerely,                           Juana Quiroz MD  [facility], [address] [place], [place] [id number]    Document electronically generated by:  Juana Quiroz MD on 9/25/2023    CC: No Recipients    Enclosure